# Patient Record
Sex: MALE | Race: WHITE | NOT HISPANIC OR LATINO | Employment: OTHER | URBAN - NONMETROPOLITAN AREA
[De-identification: names, ages, dates, MRNs, and addresses within clinical notes are randomized per-mention and may not be internally consistent; named-entity substitution may affect disease eponyms.]

---

## 2018-10-25 ENCOUNTER — OFFICE VISIT (OUTPATIENT)
Dept: INTERNAL MEDICINE | Facility: CLINIC | Age: 53
End: 2018-10-25

## 2018-10-25 VITALS
WEIGHT: 239 LBS | OXYGEN SATURATION: 98 % | SYSTOLIC BLOOD PRESSURE: 140 MMHG | HEART RATE: 74 BPM | HEIGHT: 72 IN | BODY MASS INDEX: 32.37 KG/M2 | RESPIRATION RATE: 16 BRPM | TEMPERATURE: 97.4 F | DIASTOLIC BLOOD PRESSURE: 98 MMHG

## 2018-10-25 DIAGNOSIS — E66.9 CLASS 1 OBESITY WITH BODY MASS INDEX (BMI) OF 32.0 TO 32.9 IN ADULT, UNSPECIFIED OBESITY TYPE, UNSPECIFIED WHETHER SERIOUS COMORBIDITY PRESENT: ICD-10-CM

## 2018-10-25 DIAGNOSIS — Z12.5 ENCOUNTER FOR SCREENING FOR MALIGNANT NEOPLASM OF PROSTATE: ICD-10-CM

## 2018-10-25 DIAGNOSIS — Z00.00 PHYSICAL EXAM, ANNUAL: ICD-10-CM

## 2018-10-25 DIAGNOSIS — K21.9 GASTROESOPHAGEAL REFLUX DISEASE, ESOPHAGITIS PRESENCE NOT SPECIFIED: ICD-10-CM

## 2018-10-25 DIAGNOSIS — F41.9 ANXIETY: ICD-10-CM

## 2018-10-25 DIAGNOSIS — M25.50 MULTIPLE JOINT PAIN: ICD-10-CM

## 2018-10-25 DIAGNOSIS — R73.9 HYPERGLYCEMIA: ICD-10-CM

## 2018-10-25 DIAGNOSIS — M79.10 MUSCLE TENDERNESS: ICD-10-CM

## 2018-10-25 DIAGNOSIS — E55.9 VITAMIN D INSUFFICIENCY: ICD-10-CM

## 2018-10-25 DIAGNOSIS — R53.83 OTHER FATIGUE: Primary | ICD-10-CM

## 2018-10-25 LAB
EXPIRATION DATE: NORMAL
HBA1C MFR BLD: 5.4 %
Lab: NORMAL

## 2018-10-25 PROCEDURE — 99386 PREV VISIT NEW AGE 40-64: CPT | Performed by: NURSE PRACTITIONER

## 2018-10-25 PROCEDURE — 83036 HEMOGLOBIN GLYCOSYLATED A1C: CPT | Performed by: NURSE PRACTITIONER

## 2018-10-25 RX ORDER — SERTRALINE HYDROCHLORIDE 25 MG/1
25 TABLET, FILM COATED ORAL DAILY
Qty: 30 TABLET | Refills: 1 | Status: SHIPPED | OUTPATIENT
Start: 2018-10-25 | End: 2018-12-14 | Stop reason: SDUPTHER

## 2018-10-25 RX ORDER — OMEPRAZOLE 40 MG/1
CAPSULE, DELAYED RELEASE ORAL
Refills: 2 | COMMUNITY
Start: 2018-10-16 | End: 2019-07-17 | Stop reason: SDUPTHER

## 2018-10-25 RX ORDER — NAPROXEN 500 MG/1
500 TABLET ORAL 2 TIMES DAILY
Refills: 0 | COMMUNITY
Start: 2018-08-01 | End: 2020-11-23

## 2018-10-25 RX ORDER — CYCLOBENZAPRINE HCL 5 MG
5 TABLET ORAL NIGHTLY PRN
Qty: 15 TABLET | Refills: 0 | Status: SHIPPED | OUTPATIENT
Start: 2018-10-25 | End: 2020-11-23

## 2018-10-25 RX ORDER — FENOFIBRATE 48 MG/1
TABLET ORAL
Refills: 2 | COMMUNITY
Start: 2018-10-16 | End: 2018-11-26 | Stop reason: SDUPTHER

## 2018-10-30 ENCOUNTER — APPOINTMENT (OUTPATIENT)
Dept: LAB | Facility: HOSPITAL | Age: 53
End: 2018-10-30

## 2018-10-30 LAB
25(OH)D3 SERPL-MCNC: 59 NG/ML
ALBUMIN SERPL-MCNC: 5 G/DL (ref 3.5–5)
ALBUMIN/GLOB SERPL: 1.4 G/DL (ref 1–2)
ALP SERPL-CCNC: 47 U/L (ref 38–126)
ALT SERPL W P-5'-P-CCNC: 102 U/L (ref 13–69)
ANION GAP SERPL CALCULATED.3IONS-SCNC: 11.9 MMOL/L (ref 10–20)
AST SERPL-CCNC: 60 U/L (ref 15–46)
BASOPHILS # BLD AUTO: 0.05 10*3/MM3 (ref 0–0.2)
BASOPHILS NFR BLD AUTO: 1 % (ref 0–2.5)
BILIRUB SERPL-MCNC: 0.8 MG/DL (ref 0.2–1.3)
BUN BLD-MCNC: 19 MG/DL (ref 7–20)
BUN/CREAT SERPL: 15.8 (ref 6.3–21.9)
CALCIUM SPEC-SCNC: 9.8 MG/DL (ref 8.4–10.2)
CHLORIDE SERPL-SCNC: 102 MMOL/L (ref 98–107)
CHOLEST SERPL-MCNC: 214 MG/DL (ref 0–199)
CO2 SERPL-SCNC: 28 MMOL/L (ref 26–30)
CREAT BLD-MCNC: 1.2 MG/DL (ref 0.6–1.3)
DEPRECATED RDW RBC AUTO: 41.4 FL (ref 37–54)
EOSINOPHIL # BLD AUTO: 0.25 10*3/MM3 (ref 0–0.7)
EOSINOPHIL NFR BLD AUTO: 4.9 % (ref 0–7)
ERYTHROCYTE [DISTWIDTH] IN BLOOD BY AUTOMATED COUNT: 13.1 % (ref 11.5–14.5)
GFR SERPL CREATININE-BSD FRML MDRD: 63 ML/MIN/1.73
GLOBULIN UR ELPH-MCNC: 3.6 GM/DL
GLUCOSE BLD-MCNC: 113 MG/DL (ref 74–98)
HCT VFR BLD AUTO: 47.7 % (ref 42–52)
HDLC SERPL-MCNC: 56 MG/DL (ref 40–60)
HGB BLD-MCNC: 15.8 G/DL (ref 14–18)
IMM GRANULOCYTES # BLD: 0.03 10*3/MM3 (ref 0–0.06)
IMM GRANULOCYTES NFR BLD: 0.6 % (ref 0–0.6)
LDLC SERPL CALC-MCNC: 122 MG/DL (ref 0–99)
LDLC/HDLC SERPL: 2.17 {RATIO}
LYMPHOCYTES # BLD AUTO: 1.14 10*3/MM3 (ref 0.6–3.4)
LYMPHOCYTES NFR BLD AUTO: 22.2 % (ref 10–50)
MCH RBC QN AUTO: 29 PG (ref 27–31)
MCHC RBC AUTO-ENTMCNC: 33.1 G/DL (ref 30–37)
MCV RBC AUTO: 87.5 FL (ref 80–94)
MONOCYTES # BLD AUTO: 0.6 10*3/MM3 (ref 0–0.9)
MONOCYTES NFR BLD AUTO: 11.7 % (ref 0–12)
NEUTROPHILS # BLD AUTO: 3.07 10*3/MM3 (ref 2–6.9)
NEUTROPHILS NFR BLD AUTO: 59.6 % (ref 37–80)
NRBC BLD MANUAL-RTO: 0 /100 WBC (ref 0–0)
PLATELET # BLD AUTO: 252 10*3/MM3 (ref 130–400)
PMV BLD AUTO: 10.7 FL (ref 6–12)
POTASSIUM BLD-SCNC: 3.9 MMOL/L (ref 3.5–5.1)
PROT SERPL-MCNC: 8.6 G/DL (ref 6.3–8.2)
PSA SERPL-MCNC: 0.69 NG/ML (ref 0.06–4)
RBC # BLD AUTO: 5.45 10*6/MM3 (ref 4.7–6.1)
SODIUM BLD-SCNC: 138 MMOL/L (ref 137–145)
T4 FREE SERPL-MCNC: 1.1 NG/DL (ref 0.78–2.19)
TRIGL SERPL-MCNC: 182 MG/DL
TSH SERPL DL<=0.05 MIU/L-ACNC: 3.13 MIU/ML (ref 0.47–4.68)
VLDLC SERPL-MCNC: 36.4 MG/DL
WBC NRBC COR # BLD: 5.14 10*3/MM3 (ref 4.8–10.8)

## 2018-10-30 PROCEDURE — 84403 ASSAY OF TOTAL TESTOSTERONE: CPT | Performed by: NURSE PRACTITIONER

## 2018-10-30 PROCEDURE — 80053 COMPREHEN METABOLIC PANEL: CPT | Performed by: NURSE PRACTITIONER

## 2018-10-30 PROCEDURE — G0103 PSA SCREENING: HCPCS | Performed by: NURSE PRACTITIONER

## 2018-10-30 PROCEDURE — 80061 LIPID PANEL: CPT | Performed by: NURSE PRACTITIONER

## 2018-10-30 PROCEDURE — 36415 COLL VENOUS BLD VENIPUNCTURE: CPT | Performed by: NURSE PRACTITIONER

## 2018-10-30 PROCEDURE — 84402 ASSAY OF FREE TESTOSTERONE: CPT | Performed by: NURSE PRACTITIONER

## 2018-10-30 PROCEDURE — 82306 VITAMIN D 25 HYDROXY: CPT | Performed by: NURSE PRACTITIONER

## 2018-10-30 PROCEDURE — 84439 ASSAY OF FREE THYROXINE: CPT | Performed by: NURSE PRACTITIONER

## 2018-10-30 PROCEDURE — 84443 ASSAY THYROID STIM HORMONE: CPT | Performed by: NURSE PRACTITIONER

## 2018-10-30 PROCEDURE — 85025 COMPLETE CBC W/AUTO DIFF WBC: CPT | Performed by: NURSE PRACTITIONER

## 2018-10-31 ENCOUNTER — TELEPHONE (OUTPATIENT)
Dept: INTERNAL MEDICINE | Facility: CLINIC | Age: 53
End: 2018-10-31

## 2018-10-31 NOTE — TELEPHONE ENCOUNTER
Wife called stating that patient is dealing with a lot of anxiety waiting for the blood test results and would like a call back to discuss the results. Please advise.

## 2018-10-31 NOTE — TELEPHONE ENCOUNTER
Left vm for patient that all labs not back and as soon as they was we would call. Not to stress over these

## 2018-11-02 LAB
TESTOST FREE SERPL-MCNC: 10.4 PG/ML (ref 7.2–24)
TESTOST SERPL-MCNC: 505.4 NG/DL (ref 264–916)

## 2018-11-26 ENCOUNTER — OFFICE VISIT (OUTPATIENT)
Dept: INTERNAL MEDICINE | Facility: CLINIC | Age: 53
End: 2018-11-26

## 2018-11-26 VITALS
WEIGHT: 239 LBS | DIASTOLIC BLOOD PRESSURE: 99 MMHG | HEIGHT: 72 IN | TEMPERATURE: 97.9 F | OXYGEN SATURATION: 100 % | RESPIRATION RATE: 16 BRPM | BODY MASS INDEX: 32.37 KG/M2 | HEART RATE: 80 BPM | SYSTOLIC BLOOD PRESSURE: 143 MMHG

## 2018-11-26 DIAGNOSIS — Z23 NEED FOR HEPATITIS A VACCINATION: ICD-10-CM

## 2018-11-26 DIAGNOSIS — N52.9 ERECTILE DYSFUNCTION, UNSPECIFIED ERECTILE DYSFUNCTION TYPE: ICD-10-CM

## 2018-11-26 DIAGNOSIS — E78.2 MIXED HYPERLIPIDEMIA: ICD-10-CM

## 2018-11-26 DIAGNOSIS — E66.9 CLASS 1 OBESITY WITH BODY MASS INDEX (BMI) OF 32.0 TO 32.9 IN ADULT, UNSPECIFIED OBESITY TYPE, UNSPECIFIED WHETHER SERIOUS COMORBIDITY PRESENT: ICD-10-CM

## 2018-11-26 DIAGNOSIS — J01.40 ACUTE PANSINUSITIS, RECURRENCE NOT SPECIFIED: ICD-10-CM

## 2018-11-26 DIAGNOSIS — M25.50 MULTIPLE JOINT PAIN: ICD-10-CM

## 2018-11-26 DIAGNOSIS — F41.9 ANXIETY: Primary | ICD-10-CM

## 2018-11-26 DIAGNOSIS — R53.83 OTHER FATIGUE: ICD-10-CM

## 2018-11-26 DIAGNOSIS — R74.8 ELEVATED LIVER ENZYMES: ICD-10-CM

## 2018-11-26 PROCEDURE — 90632 HEPA VACCINE ADULT IM: CPT | Performed by: NURSE PRACTITIONER

## 2018-11-26 PROCEDURE — 99214 OFFICE O/P EST MOD 30 MIN: CPT | Performed by: NURSE PRACTITIONER

## 2018-11-26 PROCEDURE — 90471 IMMUNIZATION ADMIN: CPT | Performed by: NURSE PRACTITIONER

## 2018-11-26 RX ORDER — FENOFIBRATE 48 MG/1
48 TABLET, COATED ORAL DAILY
Qty: 90 TABLET | Refills: 0 | Status: SHIPPED | OUTPATIENT
Start: 2018-11-26 | End: 2019-04-29 | Stop reason: SDUPTHER

## 2018-11-26 RX ORDER — AMOXICILLIN AND CLAVULANATE POTASSIUM 875; 125 MG/1; MG/1
1 TABLET, FILM COATED ORAL 2 TIMES DAILY
Qty: 20 TABLET | Refills: 0 | Status: SHIPPED | OUTPATIENT
Start: 2018-11-26 | End: 2018-12-06

## 2018-11-26 NOTE — PROGRESS NOTES
Chief Complaint / Reason:      Chief Complaint   Patient presents with   • Fatigue     patient states he is feeling better, not as tired   • medication refills     patient needs refills on Fenofibrate 48 mg sent to [facility]     HPI  Patient presents today for follow-up regarding fatigue and medication refills.  He states that he is feeling a lot better and not as tired.  He has cut down drinking whiskey and does continue to drink beer.  He states that he feels like the medication is working as he is not as anxious but it has affected his ability to maintain an erection.  He also states that his wife is going through menopause so that is a lot of the issues also.  He denies SI or HI.  He states he is taking the medication as prescribed in regards to the neck pain he has not tried the muscle relaxer but states that he will.  Vital signs are stable with exception of elevated blood pressure but they have been checking at home and it has been in normal range.  Patient also complains about sinus pressure headache and congestion he states it's been going on for a few weeks and as pressure in his ears.  Denies fever or chills.  History taken from: patient    PMH/FH/Social History were reviewed and updated appropriately in the electronic medical record.     Review of Systems:   Review of Systems   Constitutional: Negative.    HENT: Positive for congestion, sinus pressure, sinus pain and sore throat.    Respiratory: Negative.    Cardiovascular: Negative.    Gastrointestinal: Negative.    Skin: Negative.    Neurological: Positive for headaches.   Hematological: Negative for adenopathy.   Psychiatric/Behavioral: The patient is nervous/anxious.      All other systems were reviewed and are negative.  Exceptions are noted in the subjective or above.      Objective     Vital Signs  Vitals:    11/26/18 1120   BP: 143/99   Pulse: 80   Resp: 16   Temp: 97.9 °F (36.6 °C)   SpO2: 100%       Body mass index is  32.41 kg/m².    Physical Exam   Constitutional: He is oriented to person, place, and time. He appears well-developed and well-nourished.   HENT:   Head: Normocephalic.   Right Ear: External ear normal. Tympanic membrane is erythematous and bulging.   Left Ear: External ear normal. Tympanic membrane is erythematous and bulging.   Nose: Right sinus exhibits maxillary sinus tenderness and frontal sinus tenderness. Left sinus exhibits maxillary sinus tenderness and frontal sinus tenderness.   Mouth/Throat: Mucous membranes are dry. Posterior oropharyngeal erythema present.   Cardiovascular: Normal rate, regular rhythm, normal heart sounds and intact distal pulses.   Pulmonary/Chest: Effort normal and breath sounds normal. He exhibits no tenderness.   Abdominal: Soft. Bowel sounds are normal.   Lymphadenopathy:     He has no cervical adenopathy.   Neurological: He is alert and oriented to person, place, and time.   Skin: Skin is warm and dry. Capillary refill takes less than 2 seconds.   Psychiatric: His behavior is normal. Judgment and thought content normal. His mood appears anxious.   Nursing note and vitals reviewed.       Results Review:    I reviewed the patient's previous clinical results.       Medication Review:     Current Outpatient Medications:   •  cyclobenzaprine (FLEXERIL) 5 MG tablet, Take 1 tablet by mouth At Night As Needed for Muscle Spasms., Disp: 15 tablet, Rfl: 0  •  fenofibrate (TRICOR) 48 MG tablet, Take 1 tablet by mouth Daily for 90 days., Disp: 90 tablet, Rfl: 0  •  naproxen (NAPROSYN) 500 MG tablet, Take 500 mg by mouth 2 (Two) Times a Day., Disp: , Rfl: 0  •  omeprazole (priLOSEC) 40 MG capsule, TAKE ONE CAPSULE BY MOUTH EVERY DAY BEFORE A MEAL, Disp: , Rfl: 2  •  sertraline (ZOLOFT) 25 MG tablet, Take 1 tablet by mouth Daily., Disp: 30 tablet, Rfl: 1  •  amoxicillin-clavulanate (AUGMENTIN) 875-125 MG per tablet, Take 1 tablet by mouth 2 (Two) Times a Day for 10 days., Disp: 20 tablet, Rfl:  0    Assessment/Plan   Kulwant was seen today for fatigue and medication refills.    Diagnoses and all orders for this visit:    Anxiety  Stable continue Zoloft as prescribed  Multiple joint pain  -     SHAYLEE  -     Sedimentation rate, automated    Erectile dysfunction, unspecified erectile dysfunction type  Discussed medication options and nonpharmacological behaviors.  Elevated liver enzymes  Discussed medications that may increase liver enzymes and recommend patient decrease alcohol usage  Class 1 obesity with body mass index (BMI) of 32.0 to 32.9 in adult, unspecified obesity type, unspecified whether serious comorbidity present  Patient's Body mass index is 32.41 kg/m². BMI is above normal parameters. Recommendations include: exercise counseling and nutrition counseling.    Other fatigue  improving  Acute pansinusitis, recurrence not specified  -     amoxicillin-clavulanate (AUGMENTIN) 875-125 MG per tablet; Take 1 tablet by mouth 2 (Two) Times a Day for 10 days.    Need for hepatitis A vaccination  -     Hepatitis A Vaccine Adult (HAVRIX) - Today  -     Hepatitis A Vaccine Adult  (HAVRIX) - Dose 2; Future    Mixed hyperlipidemia  -     fenofibrate (TRICOR) 48 MG tablet; Take 1 tablet by mouth Daily for 90 days.  Discussed cardiac diet with patient and recommended exercise.    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  Return in about 4 weeks (around 12/24/2018), or if symptoms worsen or fail to improve.    Jaja Denson, APRN  11/26/2018

## 2018-12-14 ENCOUNTER — TELEPHONE (OUTPATIENT)
Dept: INTERNAL MEDICINE | Facility: CLINIC | Age: 53
End: 2018-12-14

## 2018-12-14 DIAGNOSIS — F41.9 ANXIETY: ICD-10-CM

## 2018-12-14 NOTE — TELEPHONE ENCOUNTER
PATIENT'S WIFE CAM INTO THE OFFICE TO REQUEST HER 'S ZOLOFT INCREASED PER HIS REQUEST. SHE SAYS MISS MIRANDA SAID THAT WOULD BE OKAY IF HE NEEDED IT TO BE. PLEASE SEND NEW PRESCRIPTION TO PHARM. THANK YOU.   HUNTER MCCALL IN [place]

## 2019-01-04 ENCOUNTER — APPOINTMENT (OUTPATIENT)
Dept: LAB | Facility: HOSPITAL | Age: 54
End: 2019-01-04

## 2019-01-04 LAB — ERYTHROCYTE [SEDIMENTATION RATE] IN BLOOD: 1 MM/HR (ref 0–15)

## 2019-01-04 PROCEDURE — 86038 ANTINUCLEAR ANTIBODIES: CPT | Performed by: NURSE PRACTITIONER

## 2019-01-04 PROCEDURE — 85651 RBC SED RATE NONAUTOMATED: CPT | Performed by: NURSE PRACTITIONER

## 2019-01-04 PROCEDURE — 36415 COLL VENOUS BLD VENIPUNCTURE: CPT | Performed by: NURSE PRACTITIONER

## 2019-01-07 LAB — ANA SER QL: NEGATIVE

## 2019-04-29 DIAGNOSIS — E78.2 MIXED HYPERLIPIDEMIA: ICD-10-CM

## 2019-04-29 RX ORDER — FENOFIBRATE 48 MG/1
TABLET, COATED ORAL
Qty: 90 TABLET | Refills: 0 | Status: SHIPPED | OUTPATIENT
Start: 2019-04-29 | End: 2019-08-31 | Stop reason: SDUPTHER

## 2019-07-17 DIAGNOSIS — K21.9 GASTROESOPHAGEAL REFLUX DISEASE, ESOPHAGITIS PRESENCE NOT SPECIFIED: ICD-10-CM

## 2019-07-17 RX ORDER — OMEPRAZOLE 40 MG/1
40 CAPSULE, DELAYED RELEASE ORAL DAILY
Qty: 90 CAPSULE | Refills: 1 | Status: SHIPPED | OUTPATIENT
Start: 2019-07-17 | End: 2020-11-23

## 2019-07-18 ENCOUNTER — TELEPHONE (OUTPATIENT)
Dept: INTERNAL MEDICINE | Facility: CLINIC | Age: 54
End: 2019-07-18

## 2019-07-18 RX ORDER — OMEPRAZOLE 20 MG/1
20 CAPSULE, DELAYED RELEASE ORAL DAILY
Qty: 90 CAPSULE | Refills: 1 | Status: SHIPPED | OUTPATIENT
Start: 2019-07-18 | End: 2020-09-30

## 2019-07-18 NOTE — TELEPHONE ENCOUNTER
Patient's wife Diane called stating that refill of omeprazole that was sent in, was the wrong dosage. States that patient has always taken 20mg. Can you look into this? Note from 10/2018  states 40mg.

## 2019-08-31 DIAGNOSIS — E78.2 MIXED HYPERLIPIDEMIA: ICD-10-CM

## 2019-09-03 RX ORDER — FENOFIBRATE 48 MG/1
TABLET, COATED ORAL
Qty: 90 TABLET | Refills: 0 | Status: SHIPPED | OUTPATIENT
Start: 2019-09-03 | End: 2020-02-15

## 2019-10-04 DIAGNOSIS — F41.9 ANXIETY: ICD-10-CM

## 2020-01-30 ENCOUNTER — TELEPHONE (OUTPATIENT)
Dept: INTERNAL MEDICINE | Facility: CLINIC | Age: 55
End: 2020-01-30

## 2020-01-31 DIAGNOSIS — Z20.828 EXPOSURE TO THE FLU: Primary | ICD-10-CM

## 2020-01-31 RX ORDER — OSELTAMIVIR PHOSPHATE 75 MG/1
75 CAPSULE ORAL DAILY
Qty: 10 CAPSULE | Refills: 0 | Status: SHIPPED | OUTPATIENT
Start: 2020-01-31 | End: 2020-02-10

## 2020-01-31 NOTE — TELEPHONE ENCOUNTER
I sent in Rx.  Please tell patient that it needs to be started within 48 hours otherwise it is not effective.

## 2020-02-15 DIAGNOSIS — E78.2 MIXED HYPERLIPIDEMIA: ICD-10-CM

## 2020-02-15 RX ORDER — FENOFIBRATE 48 MG/1
TABLET, COATED ORAL
Qty: 90 TABLET | Refills: 0 | Status: SHIPPED | OUTPATIENT
Start: 2020-02-15 | End: 2020-05-28

## 2020-02-24 DIAGNOSIS — F41.9 ANXIETY: ICD-10-CM

## 2020-03-31 DIAGNOSIS — F41.9 ANXIETY: ICD-10-CM

## 2020-05-27 DIAGNOSIS — E78.2 MIXED HYPERLIPIDEMIA: ICD-10-CM

## 2020-05-28 RX ORDER — FENOFIBRATE 48 MG/1
TABLET, COATED ORAL
Qty: 90 TABLET | Refills: 0 | Status: SHIPPED | OUTPATIENT
Start: 2020-05-28 | End: 2020-09-09

## 2020-09-08 DIAGNOSIS — E78.2 MIXED HYPERLIPIDEMIA: ICD-10-CM

## 2020-09-09 DIAGNOSIS — E78.2 MIXED HYPERLIPIDEMIA: ICD-10-CM

## 2020-09-09 RX ORDER — FENOFIBRATE 48 MG/1
48 TABLET, COATED ORAL DAILY
Qty: 30 TABLET | Refills: 0 | Status: SHIPPED | OUTPATIENT
Start: 2020-09-09 | End: 2020-09-09

## 2020-09-09 RX ORDER — FENOFIBRATE 48 MG/1
TABLET, COATED ORAL
Qty: 30 TABLET | Refills: 0 | Status: SHIPPED | OUTPATIENT
Start: 2020-09-09 | End: 2020-11-23 | Stop reason: SDUPTHER

## 2020-09-09 RX ORDER — FENOFIBRATE 48 MG/1
TABLET, COATED ORAL
Qty: 90 TABLET | OUTPATIENT
Start: 2020-09-09

## 2020-09-30 RX ORDER — OMEPRAZOLE 20 MG/1
CAPSULE, DELAYED RELEASE ORAL
Qty: 90 CAPSULE | OUTPATIENT
Start: 2020-09-30

## 2020-09-30 RX ORDER — OMEPRAZOLE 20 MG/1
20 CAPSULE, DELAYED RELEASE ORAL DAILY
Qty: 30 CAPSULE | Refills: 0 | Status: SHIPPED | OUTPATIENT
Start: 2020-09-30 | End: 2020-11-23 | Stop reason: SDUPTHER

## 2020-10-12 DIAGNOSIS — F41.9 ANXIETY: ICD-10-CM

## 2020-11-23 ENCOUNTER — TELEMEDICINE (OUTPATIENT)
Dept: INTERNAL MEDICINE | Facility: CLINIC | Age: 55
End: 2020-11-23

## 2020-11-23 VITALS — HEART RATE: 82 BPM | DIASTOLIC BLOOD PRESSURE: 83 MMHG | SYSTOLIC BLOOD PRESSURE: 122 MMHG

## 2020-11-23 DIAGNOSIS — F41.9 ANXIETY: ICD-10-CM

## 2020-11-23 DIAGNOSIS — K21.9 GASTROESOPHAGEAL REFLUX DISEASE, UNSPECIFIED WHETHER ESOPHAGITIS PRESENT: ICD-10-CM

## 2020-11-23 DIAGNOSIS — E78.2 MIXED HYPERLIPIDEMIA: ICD-10-CM

## 2020-11-23 DIAGNOSIS — E78.2 MIXED HYPERLIPIDEMIA: Primary | ICD-10-CM

## 2020-11-23 PROCEDURE — 99213 OFFICE O/P EST LOW 20 MIN: CPT | Performed by: NURSE PRACTITIONER

## 2020-11-23 RX ORDER — FENOFIBRATE 48 MG/1
48 TABLET, COATED ORAL DAILY
Qty: 30 TABLET | Refills: 5 | Status: CANCELLED | OUTPATIENT
Start: 2020-11-23

## 2020-11-23 RX ORDER — FENOFIBRATE 48 MG/1
48 TABLET, COATED ORAL DAILY
Qty: 90 TABLET | Refills: 1 | Status: SHIPPED | OUTPATIENT
Start: 2020-11-23 | End: 2022-01-05 | Stop reason: SDUPTHER

## 2020-11-23 RX ORDER — OMEPRAZOLE 20 MG/1
20 CAPSULE, DELAYED RELEASE ORAL DAILY
Qty: 90 CAPSULE | Refills: 1 | Status: SHIPPED | OUTPATIENT
Start: 2020-11-23 | End: 2021-08-09

## 2020-11-23 NOTE — PROGRESS NOTES
You have chosen to receive care through a telehealth visit.  Do you consent to use a video/audio connection for your medical care today? Yes  Involved parties: Em Grant CMA, TRINA Posada and patient.  Patient presents for a virtual visit. This visit was scheduled as a video visit to comply with patient safety concerns in accordance with CDC recommendations.    Chief Complaint / Reason:      Chief Complaint   Patient presents with   • Anxiety   • Hyperlipidemia       Subjective     HPI  Patient presents today for follow-up regarding anxiety and hyperlipidemia.  He states he is stable on the medication he states that he does not take his cholesterol medicine that often as he does take fish oil.  He does chew tobacco that he grows and it does not have the additives in it and states that he has been doing dietary modifications along with exercise and has lost some weight.  He states he was to get down to 200 pounds.  He states his blood pressure is doing better.  Denies chest pain, shortness of breath or heart palpitations denies any lower extremity edema or any acute issues at this time.  History taken from: patient    PMH/FH/Social History were reviewed and updated appropriately in the electronic medical record.   Past Medical History:   Diagnosis Date   • Hyperlipidemia      Past Surgical History:   Procedure Laterality Date   • LEG SURGERY       Social History     Socioeconomic History   • Marital status:      Spouse name: Not on file   • Number of children: Not on file   • Years of education: Not on file   • Highest education level: Not on file   Tobacco Use   • Smoking status: Former Smoker   • Smokeless tobacco: Current User   Substance and Sexual Activity   • Alcohol use: Yes     Alcohol/week: 6.0 standard drinks     Types: 5 Cans of beer, 1 Shots of liquor per week   • Drug use: No     Family History   Problem Relation Age of Onset   • Hypertension Mother    • Arthritis Mother    • Cancer  Father    • Hyperlipidemia Father    • Diabetes Sister    • Anxiety disorder Brother    • Cancer Maternal Grandfather    • Diabetes Paternal Grandmother        Review of Systems:   Review of Systems   Constitutional: Negative.    Respiratory: Negative.    Cardiovascular: Negative.    Neurological: Negative.    Psychiatric/Behavioral: Negative.          All other systems were reviewed and are negative.  Exceptions are noted in the subjective or above.      Objective     Vital Signs  Vitals:    11/23/20 1004   BP: 122/83   Pulse: 82       There is no height or weight on file to calculate BMI.    Physical Exam  Nursing note reviewed.   Constitutional:       General: He is not in acute distress.     Appearance: He is well-developed. He is not diaphoretic.   HENT:      Head: Normocephalic.   Pulmonary:      Effort: Pulmonary effort is normal. No respiratory distress.   Chest:      Chest wall: No tenderness.   Skin:     General: Skin is warm and dry.      Capillary Refill: Capillary refill takes less than 2 seconds.   Neurological:      Mental Status: He is alert and oriented to person, place, and time.   Psychiatric:         Behavior: Behavior normal.         Thought Content: Thought content normal.         Judgment: Judgment normal.              Results Review:    I reviewed the patient's previous clinical results.       Medication Review:     Current Outpatient Medications:   •  fenofibrate (TRICOR) 48 MG tablet, Take 1 tablet by mouth Daily., Disp: 90 tablet, Rfl: 1  •  omeprazole (priLOSEC) 20 MG capsule, Take 1 capsule by mouth Daily., Disp: 90 capsule, Rfl: 1  •  sertraline (ZOLOFT) 50 MG tablet, Take 1 tablet by mouth Daily., Disp: 90 tablet, Rfl: 1    Diagnoses and all orders for this visit:    Mixed hyperlipidemia  -     fenofibrate (TRICOR) 48 MG tablet; Take 1 tablet by mouth Daily.  Stable without worsening signs and symptoms  Anxiety  -     sertraline (ZOLOFT) 50 MG tablet; Take 1 tablet by mouth  Daily.  Stable he states he only takes half a tablet and he would like to continue current medication regimen  Gastroesophageal reflux disease, unspecified whether esophagitis present  -     omeprazole (priLOSEC) 20 MG capsule; Take 1 capsule by mouth Daily.    Stable without worsening signs and symptoms    Advised patient to follow-up for annual physical/labs and he would like to wait until Covid decreases as he and his wife are both nervous.  tReturn if symptoms worsen or fail to improve, for Annual.    Jaja Denson, APRN  11/23/2020

## 2021-03-11 ENCOUNTER — TELEMEDICINE (OUTPATIENT)
Dept: INTERNAL MEDICINE | Facility: CLINIC | Age: 56
End: 2021-03-11

## 2021-03-11 DIAGNOSIS — G47.30 SLEEP APNEA WITH USE OF CONTINUOUS POSITIVE AIRWAY PRESSURE (CPAP): ICD-10-CM

## 2021-03-11 DIAGNOSIS — I78.1 TELANGIECTASIA OF FACE: ICD-10-CM

## 2021-03-11 DIAGNOSIS — L71.9 ROSACEA: Primary | ICD-10-CM

## 2021-03-11 PROCEDURE — 99214 OFFICE O/P EST MOD 30 MIN: CPT | Performed by: NURSE PRACTITIONER

## 2021-03-11 RX ORDER — METRONIDAZOLE 10 MG/G
GEL TOPICAL DAILY
Qty: 55 G | Refills: 1 | Status: SHIPPED | OUTPATIENT
Start: 2021-03-11 | End: 2022-03-24

## 2021-03-11 NOTE — PROGRESS NOTES
Chief Complaint  Sleep Apnea        Kulwant Arango presents to [facility]  You have chosen to receive care through a telehealth visit.  Do you consent to use a video/audio connection for your medical care today? Yes  Active parties with video visit  Jaja Morales [facility]  Kulwant Arango Patient      Sleep Apnea  Associated symptoms include fatigue.         Chief Complaint / Reason:      Chief Complaint   Patient presents with   • Sleep Apnea       Subjective     HPI  Patient presents today with complaints of sleep apnea and states that he has been wearing the same mask for years he states that he was diagnosed probably 15 years ago.  He denies chest pain, shortness of breath or heart palpitations.  He is requesting a new mask but has not had any testing for quite some time.  He did check his vitals and blood pressure was 119/80 heart rate 77.  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  He does have redness on both of his cheeks and sometimes wears a hat when forming and sometimes he does not.  Patient does drink alcohol and chews tobacco.    History taken from: patient/wife    PMH/FH/Social History were reviewed and updated appropriately in the electronic medical record.   Past Medical History:   Diagnosis Date   • Hyperlipidemia      Past Surgical History:   Procedure Laterality Date   • LEG SURGERY       Social History     Socioeconomic History   • Marital status:      Spouse name: Not on file   • Number of children: Not on file   • Years of education: Not on file   • Highest education level: Not on file   Tobacco Use   • Smoking status: Former Smoker   • Smokeless tobacco: Current User   Substance and Sexual Activity   • Alcohol use: Yes     Alcohol/week: 6.0 standard drinks     Types: 5 Cans of beer, 1 Shots of liquor per week   • Drug use:  No     Family History   Problem Relation Age of Onset   • Hypertension Mother    • Arthritis Mother    • Cancer Father    • Hyperlipidemia Father    • Diabetes Sister    • Anxiety disorder Brother    • Cancer Maternal Grandfather    • Diabetes Paternal Grandmother        Review of Systems:   Review of Systems   Constitutional: Positive for fatigue.   Respiratory: Negative.    Cardiovascular: Negative.    Skin: Positive for color change and dry skin.   Allergic/Immunologic: Positive for environmental allergies.   Neurological: Negative.    Psychiatric/Behavioral: Negative.  Positive for stress.         All other systems were reviewed and are negative.  Exceptions are noted in the subjective or above.      Objective     Vital Signs  There were no vitals filed for this visit.    There is no height or weight on file to calculate BMI.    Physical Exam  Nursing note reviewed.   Constitutional:       General: He is not in acute distress.     Appearance: He is well-developed. He is not diaphoretic.   HENT:      Head: Normocephalic.   Pulmonary:      Effort: Pulmonary effort is normal. No respiratory distress.   Chest:      Chest wall: No tenderness.   Skin:     General: Skin is warm and dry.      Capillary Refill: Capillary refill takes less than 2 seconds.      Findings: Erythema and rash present.             Comments: Physical examination directed per patient redness noted on bilateral cheeks.   Neurological:      Mental Status: He is alert and oriented to person, place, and time.   Psychiatric:         Behavior: Behavior normal.         Thought Content: Thought content normal.         Judgment: Judgment normal.              Results Review:    I reviewed the patient's previous clinical results.       Medication Review:     Current Outpatient Medications:   •  fenofibrate (TRICOR) 48 MG tablet, Take 1 tablet by mouth Daily., Disp: 90 tablet, Rfl: 1  •  omeprazole (priLOSEC) 20 MG capsule, Take 1 capsule by mouth Daily.,  Disp: 90 capsule, Rfl: 1  •  sertraline (ZOLOFT) 50 MG tablet, Take 1 tablet by mouth Daily., Disp: 90 tablet, Rfl: 1  •  metroNIDAZOLE (Metrogel) 1 % gel, Apply  topically to the appropriate area as directed Daily., Disp: 55 g, Rfl: 1    Diagnoses and all orders for this visit:    Rosacea  -     metroNIDAZOLE (Metrogel) 1 % gel; Apply  topically to the appropriate area as directed Daily.  -     Ambulatory Referral to Dermatology  Discussed nonpharmacological interventions with patient and recommend wearing hat when outside.  Telangiectasia of face  -     Ambulatory Referral to Dermatology  Recommend limiting alcohol intake and wearing hats when outside to prevent sun damage    sleep apnea with use of continuous positive airway pressure (CPAP)  -     Ambulatory Referral to Sleep Medicine          Return if symptoms worsen or fail to improve.    Jaja Denson, APRN  03/11/2021

## 2021-03-23 ENCOUNTER — OFFICE VISIT (OUTPATIENT)
Dept: NEUROLOGY | Facility: CLINIC | Age: 56
End: 2021-03-23

## 2021-03-23 VITALS
OXYGEN SATURATION: 96 % | DIASTOLIC BLOOD PRESSURE: 80 MMHG | WEIGHT: 242.4 LBS | HEIGHT: 72 IN | BODY MASS INDEX: 32.83 KG/M2 | SYSTOLIC BLOOD PRESSURE: 130 MMHG | HEART RATE: 75 BPM | TEMPERATURE: 97.3 F

## 2021-03-23 DIAGNOSIS — G47.33 OSA (OBSTRUCTIVE SLEEP APNEA): Primary | ICD-10-CM

## 2021-03-23 DIAGNOSIS — E78.5 DYSLIPIDEMIA: ICD-10-CM

## 2021-03-23 PROCEDURE — 99214 OFFICE O/P EST MOD 30 MIN: CPT | Performed by: NURSE PRACTITIONER

## 2021-03-23 NOTE — PATIENT INSTRUCTIONS
Sleep Apnea  Sleep apnea affects breathing during sleep. It causes breathing to stop for a short time or to become shallow. It can also increase the risk of:  · Heart attack.  · Stroke.  · Being very overweight (obese).  · Diabetes.  · Heart failure.  · Irregular heartbeat.  The goal of treatment is to help you breathe normally again.  What are the causes?  There are three kinds of sleep apnea:  · Obstructive sleep apnea. This is caused by a blocked or collapsed airway.  · Central sleep apnea. This happens when the brain does not send the right signals to the muscles that control breathing.  · Mixed sleep apnea. This is a combination of obstructive and central sleep apnea.  The most common cause of this condition is a collapsed or blocked airway. This can happen if:  · Your throat muscles are too relaxed.  · Your tongue and tonsils are too large.  · You are overweight.  · Your airway is too small.  What increases the risk?  · Being overweight.  · Smoking.  · Having a small airway.  · Being older.  · Being male.  · Drinking alcohol.  · Taking medicines to calm yourself (sedatives or tranquilizers).  · Having family members with the condition.  What are the signs or symptoms?  · Trouble staying asleep.  · Being sleepy or tired during the day.  · Getting angry a lot.  · Loud snoring.  · Headaches in the morning.  · Not being able to focus your mind (concentrate).  · Forgetting things.  · Less interest in sex.  · Mood swings.  · Personality changes.  · Feelings of sadness (depression).  · Waking up a lot during the night to pee (urinate).  · Dry mouth.  · Sore throat.  How is this diagnosed?  · Your medical history.  · A physical exam.  · A test that is done when you are sleeping (sleep study). The test is most often done in a sleep lab but may also be done at home.  How is this treated?    · Sleeping on your side.  · Using a medicine to get rid of mucus in your nose (decongestant).  · Avoiding the use of alcohol,  medicines to help you relax, or certain pain medicines (narcotics).  · Losing weight, if needed.  · Changing your diet.  · Not smoking.  · Using a machine to open your airway while you sleep, such as:  ? An oral appliance. This is a mouthpiece that shifts your lower jaw forward.  ? A CPAP device. This device blows air through a mask when you breathe out (exhale).  ? An EPAP device. This has valves that you put in each nostril.  ? A BPAP device. This device blows air through a mask when you breathe in (inhale) and breathe out.  · Having surgery if other treatments do not work.  It is important to get treatment for sleep apnea. Without treatment, it can lead to:  · High blood pressure.  · Coronary artery disease.  · In men, not being able to have an erection (impotence).  · Reduced thinking ability.  Follow these instructions at home:  Lifestyle  · Make changes that your doctor recommends.  · Eat a healthy diet.  · Lose weight if needed.  · Avoid alcohol, medicines to help you relax, and some pain medicines.  · Do not use any products that contain nicotine or tobacco, such as cigarettes, e-cigarettes, and chewing tobacco. If you need help quitting, ask your doctor.  General instructions  · Take over-the-counter and prescription medicines only as told by your doctor.  · If you were given a machine to use while you sleep, use it only as told by your doctor.  · If you are having surgery, make sure to tell your doctor you have sleep apnea. You may need to bring your device with you.  · Keep all follow-up visits as told by your doctor. This is important.  Contact a doctor if:  · The machine that you were given to use during sleep bothers you or does not seem to be working.  · You do not get better.  · You get worse.  Get help right away if:  · Your chest hurts.  · You have trouble breathing in enough air.  · You have an uncomfortable feeling in your back, arms, or stomach.  · You have trouble talking.  · One side of your  body feels weak.  · A part of your face is hanging down.  These symptoms may be an emergency. Do not wait to see if the symptoms will go away. Get medical help right away. Call your local emergency services (911 in the [place] Do not drive yourself to the hospital.  Summary  · This condition affects breathing during sleep.  · The most common cause is a collapsed or blocked airway.  · The goal of treatment is to help you breathe normally while you sleep.  This information is not intended to replace advice given to you by your health care provider. Make sure you discuss any questions you have with your health care provider.  Document Revised: 10/04/2019 Document Reviewed: 08/13/2019  [employer] Patient Education © 2021 [employer]

## 2021-03-23 NOTE — PROGRESS NOTES
New Sleep Patient Office Visit      Patient Name: Kulwant Arango  : 1965   MRN: [id number]     Referring Physician: Jaja Denson AP*    Chief Complaint:    Chief Complaint   Patient presents with   • Consult     NP, In office to establish care for kiran. Patient states he has been on his machine in 15 years.        History of Present Illness: Kulwant Arango is a 55 y.o. male who is here today to establish care with Sleep Medicine.  He is currently on PAP therapy and has had the same machine for 15 years.  Unable to obtain a current compliance report today.  Sleep questionnaire reviewed.  It takes him 10-15 minutes to fall asleep, he wakes up 3 times during the night, he feels okay when he wakes up, he sleeps 6-7 hours per night.  He snored and had excessive daytime sleepiness prior to starting PAP therapy.  Additional risk factors- BMI 32, dyslipidemia.     [place] Score: 11    Subjective      Review of Systems:   Review of Systems   Constitutional: Negative for fatigue, fever, unexpected weight gain and unexpected weight loss.   HENT: Negative for hearing loss, sore throat, swollen glands, tinnitus and trouble swallowing.    Eyes: Negative for blurred vision, double vision, photophobia and visual disturbance.   Respiratory: Positive for apnea. Negative for cough, chest tightness and shortness of breath.    Cardiovascular: Negative for chest pain, palpitations and leg swelling.   Gastrointestinal: Negative for constipation, diarrhea and nausea.   Endocrine: Negative for cold intolerance and heat intolerance.   Musculoskeletal: Negative for gait problem, neck pain and neck stiffness.   Skin: Negative for color change and rash.   Allergic/Immunologic: Negative for environmental allergies and food allergies.   Neurological: Negative for dizziness, syncope, facial asymmetry, speech difficulty, weakness, headache, memory problem and confusion.   Psychiatric/Behavioral: Negative for agitation, behavioral  "problems and depressed mood. The patient is not nervous/anxious.        Past Medical History:   Past Medical History:   Diagnosis Date   • Hyperlipidemia        Past Surgical History:   Past Surgical History:   Procedure Laterality Date   • LEG SURGERY         Family History:   Family History   Problem Relation Age of Onset   • Hypertension Mother    • Arthritis Mother    • Cancer Father    • Hyperlipidemia Father    • Diabetes Sister    • Anxiety disorder Brother    • Cancer Maternal Grandfather    • Diabetes Paternal Grandmother        Social History:   Social History     Socioeconomic History   • Marital status:      Spouse name: Not on file   • Number of children: Not on file   • Years of education: Not on file   • Highest education level: Not on file   Tobacco Use   • Smoking status: Former Smoker   • Smokeless tobacco: Current User   Substance and Sexual Activity   • Alcohol use: Yes     Alcohol/week: 6.0 standard drinks     Types: 5 Cans of beer, 1 Shots of liquor per week   • Drug use: No       Medications:     Current Outpatient Medications:   •  fenofibrate (TRICOR) 48 MG tablet, Take 1 tablet by mouth Daily., Disp: 90 tablet, Rfl: 1  •  metroNIDAZOLE (Metrogel) 1 % gel, Apply  topically to the appropriate area as directed Daily., Disp: 55 g, Rfl: 1  •  omeprazole (priLOSEC) 20 MG capsule, Take 1 capsule by mouth Daily., Disp: 90 capsule, Rfl: 1  •  sertraline (ZOLOFT) 50 MG tablet, Take 1 tablet by mouth Daily., Disp: 90 tablet, Rfl: 1    Allergies:   No Known Allergies    Objective     Physical Exam:  Vital Signs:   Vitals:    03/23/21 1406   BP: 130/80   BP Location: Left arm   Patient Position: Sitting   Cuff Size: Adult   Pulse: 75   Temp: 97.3 °F (36.3 °C)   SpO2: 96%   Weight: 110 kg (242 lb 6.4 oz)   Height: 182.9 cm (72\")   PainSc: 0-No pain     BMI: Body mass index is 32.88 kg/m².  Neck Circumference: 16in     Physical Exam  Vitals and nursing note reviewed.   Constitutional:       " General: He is not in acute distress.     Appearance: Normal appearance. He is well-developed. He is not diaphoretic.   HENT:      Head: Normocephalic and atraumatic.      Comments: Mallampati 2  Eyes:      Conjunctiva/sclera: Conjunctivae normal.      Pupils: Pupils are equal, round, and reactive to light.   Neck:      Thyroid: No thyroid mass or thyromegaly.      Vascular: Normal carotid pulses.      Trachea: Trachea normal.   Cardiovascular:      Rate and Rhythm: Normal rate and regular rhythm.      Heart sounds: Normal heart sounds. No murmur heard.   No friction rub. No gallop.    Pulmonary:      Effort: Pulmonary effort is normal. No respiratory distress.      Breath sounds: Normal breath sounds. No wheezing or rales.   Musculoskeletal:         General: Normal range of motion.   Skin:     General: Skin is warm and dry.      Findings: No rash.   Neurological:      Mental Status: He is alert and oriented to person, place, and time.   Psychiatric:         Mood and Affect: Mood normal.         Behavior: Behavior normal.         Thought Content: Thought content normal.         Judgment: Judgment normal.         Assessment / Plan      Assessment/Plan:   Diagnoses and all orders for this visit:    1. LIOR (obstructive sleep apnea) (Primary)  -     Home Sleep Study; Future. Offered PSG but patient prefers a home sleep study. Has a history of LIOR and currently on PAP therapy, most recent sleep study was 15 years ago so diagnostic test needed.   - Advised patient to avoid driving if drowsy.     2. Dyslipidemia  -     Home Sleep Study; Future    3. BMI 32.0-32.9,adult  -     Home Sleep Study; Future       Follow Up:   Return in about 3 months (around 6/23/2021) for F/U Obstructive Sleep Apnea.    I have advised the patient the need to continue the use of CPAP.  Gold standard for treatment of sleep apnea includes weight loss, use of cpap and avoidance of alcohol.  Untreated LIOR may increase the risk for development of  hypertension, stroke, myocardial infarction, diabetes, cardiovascular disease, work-related issues and driving accidents. I have counseled and advised the patient to avoid driving or operating heavy/dangerous equipment if feeling drowsy.     TRINA Junior, FNP-C  [facility] Neurology and Sleep Medicine       Please note that portions of this note may have been completed with a voice recognition program. Efforts were made to edit the dictations, but occasionally words are mistranscribed.

## 2021-04-12 ENCOUNTER — HOSPITAL ENCOUNTER (OUTPATIENT)
Dept: SLEEP MEDICINE | Facility: HOSPITAL | Age: 56
Discharge: HOME | End: 2021-04-12
Admitting: NURSE PRACTITIONER

## 2021-04-12 DIAGNOSIS — G47.33 OSA (OBSTRUCTIVE SLEEP APNEA): ICD-10-CM

## 2021-04-12 DIAGNOSIS — E78.5 DYSLIPIDEMIA: ICD-10-CM

## 2021-04-12 PROCEDURE — 95806 SLEEP STUDY UNATT&RESP EFFT: CPT | Performed by: INTERNAL MEDICINE

## 2021-04-12 PROCEDURE — 95806 SLEEP STUDY UNATT&RESP EFFT: CPT

## 2021-06-10 RX ORDER — EPINEPHRINE 0.3 MG/.3ML
0.3 INJECTION SUBCUTANEOUS ONCE
Qty: 1 EACH | Refills: 1 | Status: SHIPPED | OUTPATIENT
Start: 2021-06-10 | End: 2021-06-10

## 2021-06-29 ENCOUNTER — OFFICE VISIT (OUTPATIENT)
Dept: NEUROLOGY | Facility: CLINIC | Age: 56
End: 2021-06-29

## 2021-06-29 ENCOUNTER — TELEPHONE (OUTPATIENT)
Dept: NEUROLOGY | Facility: CLINIC | Age: 56
End: 2021-06-29

## 2021-06-29 VITALS
OXYGEN SATURATION: 97 % | WEIGHT: 234 LBS | BODY MASS INDEX: 31.69 KG/M2 | HEART RATE: 73 BPM | TEMPERATURE: 98.2 F | DIASTOLIC BLOOD PRESSURE: 70 MMHG | HEIGHT: 72 IN | SYSTOLIC BLOOD PRESSURE: 120 MMHG

## 2021-06-29 DIAGNOSIS — G47.33 OBSTRUCTIVE SLEEP APNEA: Primary | ICD-10-CM

## 2021-06-29 PROCEDURE — 99212 OFFICE O/P EST SF 10 MIN: CPT | Performed by: NURSE PRACTITIONER

## 2021-06-29 NOTE — TELEPHONE ENCOUNTER
Faxed order, note, sleep study to [facility] in [place].

## 2021-06-29 NOTE — PROGRESS NOTES
New Sleep Patient Office Visit      Patient Name: Kulwant Arango  : 1965   MRN: [id number]     Referring Physician: No ref. provider found    Chief Complaint:    Chief Complaint   Patient presents with   • Follow-up     Patient in office to follow up on kiran.        History of Present Illness: Kulwant Arango is a 55 y.o. male who is here today to follow up for KIRAN.  He is accompanied by his wife today.  He was last seen on 3/23/2021 and had been on PAP therapy for about 15 years.  He needed a new machine so he had to do a new sleep study.  Home sleep study on 2021 showed severe KIRAN with an AHI of 44/hour.  Currently on AutoPap 8/16cm, mean pressure 7.9 and peak average pressure 9.8, AHI 5.0/hour, compliance >4 hours 100%.  He is tolerating his pressures well.  He says he can't sleeping without his machine.  He is having some issues finding a great mask fit.  He is currently using a nasal mask.  Additional risk factors- BMI 31, dyslipidemia, GERD, mood disorder.   *Current compliance report has been requested for review.     Subjective      Review of Systems:   Review of Systems   Constitutional: Negative for chills, fatigue and fever.   HENT: Negative for facial swelling, hearing loss, sore throat, tinnitus and trouble swallowing.    Eyes: Negative for blurred vision, double vision, photophobia and visual disturbance.   Respiratory: Negative for cough, chest tightness and shortness of breath.    Cardiovascular: Negative for chest pain, palpitations and leg swelling.   Gastrointestinal: Negative for abdominal pain, nausea and vomiting.   Endocrine: Negative for cold intolerance and heat intolerance.   Musculoskeletal: Negative for gait problem, neck pain and neck stiffness.   Skin: Negative for color change and rash.   Allergic/Immunologic: Negative for environmental allergies and food allergies.   Neurological: Negative for dizziness, syncope, speech difficulty, weakness, light-headedness, numbness, headache  "and memory problem.   Psychiatric/Behavioral: Negative for behavioral problems, sleep disturbance and depressed mood. The patient is not nervous/anxious.        Past Medical History:   Past Medical History:   Diagnosis Date   • Hyperlipidemia        Past Surgical History:   Past Surgical History:   Procedure Laterality Date   • LEG SURGERY         Family History:   Family History   Problem Relation Age of Onset   • Hypertension Mother    • Arthritis Mother    • Cancer Father    • Hyperlipidemia Father    • Diabetes Sister    • Anxiety disorder Brother    • Cancer Maternal Grandfather    • Diabetes Paternal Grandmother        Social History:   Social History     Socioeconomic History   • Marital status:      Spouse name: Not on file   • Number of children: Not on file   • Years of education: Not on file   • Highest education level: Not on file   Tobacco Use   • Smoking status: Former Smoker   • Smokeless tobacco: Current User   Substance and Sexual Activity   • Alcohol use: Yes     Alcohol/week: 6.0 standard drinks     Types: 5 Cans of beer, 1 Shots of liquor per week   • Drug use: No       Medications:     Current Outpatient Medications:   •  fenofibrate (TRICOR) 48 MG tablet, Take 1 tablet by mouth Daily., Disp: 90 tablet, Rfl: 1  •  metroNIDAZOLE (Metrogel) 1 % gel, Apply  topically to the appropriate area as directed Daily., Disp: 55 g, Rfl: 1  •  omeprazole (priLOSEC) 20 MG capsule, Take 1 capsule by mouth Daily., Disp: 90 capsule, Rfl: 1  •  sertraline (ZOLOFT) 50 MG tablet, Take 1 tablet by mouth Daily., Disp: 90 tablet, Rfl: 1    Allergies:   No Known Allergies    Objective     Physical Exam:  Vital Signs:   Vitals:    06/29/21 1106   BP: 120/70   BP Location: Left arm   Patient Position: Sitting   Cuff Size: Adult   Pulse: 73   Temp: 98.2 °F (36.8 °C)   SpO2: 97%   Weight: 106 kg (234 lb)   Height: 182.9 cm (72\")   PainSc: 0-No pain     BMI: Body mass index is 31.74 kg/m².    Physical Exam  Vitals and " nursing note reviewed.   Constitutional:       General: He is not in acute distress.     Appearance: Normal appearance. He is well-developed. He is not diaphoretic.   HENT:      Head: Normocephalic and atraumatic.   Eyes:      Conjunctiva/sclera: Conjunctivae normal.      Pupils: Pupils are equal, round, and reactive to light.   Pulmonary:      Effort: Pulmonary effort is normal. No respiratory distress.   Musculoskeletal:         General: Normal range of motion.   Skin:     General: Skin is warm and dry.      Findings: No rash.   Neurological:      Mental Status: He is alert and oriented to person, place, and time.   Psychiatric:         Mood and Affect: Mood normal.         Behavior: Behavior normal.         Thought Content: Thought content normal.         Judgment: Judgment normal.         Assessment / Plan      Assessment/Plan:   Diagnoses and all orders for this visit:    1. Obstructive sleep apnea (Primary)    2. BMI 31.0-31.9,adult    *Advised patient to avoid driving if drowsy.   *Discussed recent [employer] recall with patient and discussed implications of untreated LIOR in regards to cardiovascular health and risk for stroke. I have advised patient to contact his DME and see if his machine has been recalled. Patient would like to use the [employer] here in [place] since [place] is further away for him.     Follow Up:   Return in about 1 year (around 6/29/2022) for F/U Obstructive Sleep Apnea.    I have advised the patient the need to continue the use of CPAP.  Gold standard for treatment of sleep apnea includes weight loss, use of cpap and avoidance of alcohol.  Untreated LIOR may increase the risk for development of hypertension, stroke, myocardial infarction, diabetes, cardiovascular disease, work-related issues and driving accidents. I have counseled and advised the patient to avoid driving or operating heavy/dangerous equipment if feeling drowsy.     TRINA Junior, FNP-C  [facility]  Neurology and Sleep Medicine       Please note that portions of this note may have been completed with a voice recognition program. Efforts were made to edit the dictations, but occasionally words are mistranscribed.

## 2021-06-29 NOTE — TELEPHONE ENCOUNTER
Provider: CASEY MCKEON   Caller: GABI   Relationship to Patient:WIFE  Phone Number: [phone]  Reason for Call: INFORMATION WAS TRANSFERRED FROM [facility] IN [place] BUT THEY NEED A CURRENT SLEEP STUDY, LAST OV NOTE AND ORDERS FAXED [phone]

## 2021-08-07 DIAGNOSIS — F41.9 ANXIETY: ICD-10-CM

## 2021-08-07 DIAGNOSIS — K21.9 GASTROESOPHAGEAL REFLUX DISEASE, UNSPECIFIED WHETHER ESOPHAGITIS PRESENT: ICD-10-CM

## 2021-08-09 RX ORDER — OMEPRAZOLE 20 MG/1
20 CAPSULE, DELAYED RELEASE ORAL DAILY
Qty: 90 CAPSULE | Refills: 0 | Status: SHIPPED | OUTPATIENT
Start: 2021-08-09 | End: 2022-01-05 | Stop reason: SDUPTHER

## 2021-11-05 DIAGNOSIS — F41.9 ANXIETY: ICD-10-CM

## 2021-12-01 ENCOUNTER — OFFICE VISIT (OUTPATIENT)
Dept: INTERNAL MEDICINE | Facility: CLINIC | Age: 56
End: 2021-12-01

## 2021-12-01 VITALS
TEMPERATURE: 97.9 F | OXYGEN SATURATION: 100 % | WEIGHT: 233 LBS | HEIGHT: 72 IN | SYSTOLIC BLOOD PRESSURE: 124 MMHG | DIASTOLIC BLOOD PRESSURE: 82 MMHG | BODY MASS INDEX: 31.56 KG/M2 | HEART RATE: 76 BPM

## 2021-12-01 DIAGNOSIS — Z02.4 ENCOUNTER FOR CDL (COMMERCIAL DRIVING LICENSE) EXAM: Primary | ICD-10-CM

## 2021-12-01 LAB
BILIRUB BLD-MCNC: NEGATIVE MG/DL
CLARITY, POC: CLEAR
COLOR UR: YELLOW
EXPIRATION DATE: NORMAL
GLUCOSE UR STRIP-MCNC: NEGATIVE MG/DL
KETONES UR QL: NEGATIVE
LEUKOCYTE EST, POC: NEGATIVE
Lab: NORMAL
NITRITE UR-MCNC: NEGATIVE MG/ML
PH UR: 6 [PH] (ref 5–8)
PROT UR STRIP-MCNC: NEGATIVE MG/DL
RBC # UR STRIP: NEGATIVE /UL
SP GR UR: 1.03 (ref 1–1.03)
UROBILINOGEN UR QL: NORMAL

## 2021-12-01 PROCEDURE — 81003 URINALYSIS AUTO W/O SCOPE: CPT | Performed by: FAMILY MEDICINE

## 2021-12-01 PROCEDURE — DOTPHY: Performed by: FAMILY MEDICINE

## 2021-12-01 NOTE — PROGRESS NOTES
"12/01/2021    Kulwant Arango presents for CDL exam.    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 Mr. Kulwant Arango has sleep apnea. Last note from sleep medicine states 100% compliance with CPAP.    Kulwant Arango's form has been reviewed and can be found in scanned media.    Review of Systems   Constitutional: Negative for chills, fatigue and fever.   HENT: Positive for rhinorrhea. Negative for congestion, postnasal drip and sore throat.    Eyes: Negative for pain and itching.   Respiratory: Negative for cough, shortness of breath and wheezing.    Cardiovascular: Negative for chest pain and leg swelling.   Gastrointestinal: Negative for abdominal pain, constipation, diarrhea, nausea and vomiting.   Genitourinary: Negative for difficulty urinating and dysuria.   Musculoskeletal: Negative for arthralgias.   Skin: Negative for color change and rash.   Allergic/Immunologic: Negative for environmental allergies.   Neurological: Negative for weakness, numbness and headaches.   Hematological: Does not bruise/bleed easily.   Psychiatric/Behavioral: Negative for dysphoric mood. The patient is not nervous/anxious.        Vitals:    12/01/21 0836   BP: 124/82   Pulse: 76   Temp: 97.9 °F (36.6 °C)   SpO2: 100%   Weight: 106 kg (233 lb)   Height: 182.9 cm (72.01\")     Body mass index is 31.59 kg/m².    Physical Exam  Constitutional:       General: He is not in acute distress.     Appearance: Normal appearance. He is well-developed.   HENT:      Head: Normocephalic and atraumatic.      Right Ear: Tympanic membrane and external ear normal.      Left Ear: Tympanic membrane and external ear normal.   Eyes:      Extraocular Movements: Extraocular movements intact.      Conjunctiva/sclera: Conjunctivae normal.   Cardiovascular:      Rate and Rhythm: Normal rate and regular rhythm.      Pulses: Normal pulses.      Heart sounds: No murmur heard.      Pulmonary:      " Effort: Pulmonary effort is normal. No respiratory distress.      Breath sounds: Normal breath sounds. No wheezing.   Abdominal:      General: Bowel sounds are normal. There is no distension.      Palpations: Abdomen is soft.      Tenderness: There is no abdominal tenderness.      Hernia: No hernia is present.   Musculoskeletal:      Cervical back: Normal range of motion and neck supple.      Right lower leg: No edema.      Left lower leg: No edema.   Lymphadenopathy:      Cervical: No cervical adenopathy.   Skin:     General: Skin is warm and dry.   Neurological:      Mental Status: He is alert and oriented to person, place, and time.      Cranial Nerves: No cranial nerve deficit.      Deep Tendon Reflexes: Reflexes normal.   Psychiatric:         Mood and Affect: Mood normal.         Behavior: Behavior normal.         Brief Urine Lab Results  (Last result in the past 365 days)      Color   Clarity   Blood   Leuk Est   Nitrite   Protein   CREAT   Urine HCG        12/01/21 0856 Yellow   Clear   Negative   Negative   Negative   Negative                 Vision: visual acuity better than 20/40, color vision intact, normal peripheral vision, without correction and no monocular vision.    Hearing: passed in both ears with forced whisper at 5 feet    Kulwant Arango Meets standards, but periodic monitoring required due to LIOR.   qualified only for 1 year.    · Follow up with PCP as needed/directed.  · See scanned form for further information.  · Data logged with [facility].    Maida Acuna DO

## 2022-01-05 ENCOUNTER — OFFICE VISIT (OUTPATIENT)
Dept: INTERNAL MEDICINE | Facility: CLINIC | Age: 57
End: 2022-01-05

## 2022-01-05 VITALS
HEART RATE: 73 BPM | WEIGHT: 234 LBS | SYSTOLIC BLOOD PRESSURE: 127 MMHG | HEIGHT: 72 IN | OXYGEN SATURATION: 100 % | BODY MASS INDEX: 31.69 KG/M2 | TEMPERATURE: 97.3 F | DIASTOLIC BLOOD PRESSURE: 88 MMHG | RESPIRATION RATE: 15 BRPM

## 2022-01-05 DIAGNOSIS — K21.9 GASTROESOPHAGEAL REFLUX DISEASE, UNSPECIFIED WHETHER ESOPHAGITIS PRESENT: ICD-10-CM

## 2022-01-05 DIAGNOSIS — R53.83 FATIGUE, UNSPECIFIED TYPE: Primary | ICD-10-CM

## 2022-01-05 DIAGNOSIS — E78.2 MIXED HYPERLIPIDEMIA: ICD-10-CM

## 2022-01-05 DIAGNOSIS — R73.09 ELEVATED GLUCOSE: ICD-10-CM

## 2022-01-05 DIAGNOSIS — N52.9 DIFFICULTY ATTAINING ERECTION: ICD-10-CM

## 2022-01-05 DIAGNOSIS — F41.9 ANXIETY: ICD-10-CM

## 2022-01-05 LAB
ALBUMIN SERPL-MCNC: 5.2 G/DL (ref 3.5–5.2)
ALBUMIN/GLOB SERPL: 2.3 G/DL
ALP SERPL-CCNC: 54 U/L (ref 39–117)
ALT SERPL-CCNC: 67 U/L (ref 1–41)
AST SERPL-CCNC: 42 U/L (ref 1–40)
BASOPHILS # BLD AUTO: 0.04 10*3/MM3 (ref 0–0.2)
BASOPHILS NFR BLD AUTO: 0.9 % (ref 0–1.5)
BILIRUB SERPL-MCNC: 0.6 MG/DL (ref 0–1.2)
BUN SERPL-MCNC: 14 MG/DL (ref 6–20)
BUN/CREAT SERPL: 12.6 (ref 7–25)
CALCIUM SERPL-MCNC: 9.8 MG/DL (ref 8.6–10.5)
CHLORIDE SERPL-SCNC: 103 MMOL/L (ref 98–107)
CHOLEST SERPL-MCNC: 203 MG/DL (ref 0–200)
CO2 SERPL-SCNC: 28.1 MMOL/L (ref 22–29)
CREAT SERPL-MCNC: 1.11 MG/DL (ref 0.76–1.27)
EOSINOPHIL # BLD AUTO: 0.16 10*3/MM3 (ref 0–0.4)
EOSINOPHIL NFR BLD AUTO: 3.8 % (ref 0.3–6.2)
ERYTHROCYTE [DISTWIDTH] IN BLOOD BY AUTOMATED COUNT: 12.5 % (ref 12.3–15.4)
GLOBULIN SER CALC-MCNC: 2.3 GM/DL
GLUCOSE SERPL-MCNC: 111 MG/DL (ref 65–99)
HBA1C MFR BLD: 5.6 % (ref 4.8–5.6)
HCT VFR BLD AUTO: 46.8 % (ref 37.5–51)
HDLC SERPL-MCNC: 45 MG/DL (ref 40–60)
HGB BLD-MCNC: 15.8 G/DL (ref 13–17.7)
IMM GRANULOCYTES # BLD AUTO: 0.02 10*3/MM3 (ref 0–0.05)
IMM GRANULOCYTES NFR BLD AUTO: 0.5 % (ref 0–0.5)
LDLC SERPL CALC-MCNC: 129 MG/DL (ref 0–100)
LYMPHOCYTES # BLD AUTO: 0.96 10*3/MM3 (ref 0.7–3.1)
LYMPHOCYTES NFR BLD AUTO: 22.6 % (ref 19.6–45.3)
MCH RBC QN AUTO: 29.3 PG (ref 26.6–33)
MCHC RBC AUTO-ENTMCNC: 33.8 G/DL (ref 31.5–35.7)
MCV RBC AUTO: 86.7 FL (ref 79–97)
MONOCYTES # BLD AUTO: 0.53 10*3/MM3 (ref 0.1–0.9)
MONOCYTES NFR BLD AUTO: 12.5 % (ref 5–12)
NEUTROPHILS # BLD AUTO: 2.53 10*3/MM3 (ref 1.7–7)
NEUTROPHILS NFR BLD AUTO: 59.7 % (ref 42.7–76)
NRBC BLD AUTO-RTO: 0 /100 WBC (ref 0–0.2)
PLATELET # BLD AUTO: 244 10*3/MM3 (ref 140–450)
POTASSIUM SERPL-SCNC: 4.5 MMOL/L (ref 3.5–5.2)
PROT SERPL-MCNC: 7.5 G/DL (ref 6–8.5)
RBC # BLD AUTO: 5.4 10*6/MM3 (ref 4.14–5.8)
SODIUM SERPL-SCNC: 141 MMOL/L (ref 136–145)
T4 FREE SERPL-MCNC: 0.99 NG/DL (ref 0.93–1.7)
TRIGL SERPL-MCNC: 165 MG/DL (ref 0–150)
TSH SERPL DL<=0.005 MIU/L-ACNC: 3.83 UIU/ML (ref 0.27–4.2)
VIT B12 SERPL-MCNC: 1005 PG/ML (ref 211–946)
VLDLC SERPL CALC-MCNC: 29 MG/DL (ref 5–40)
WBC # BLD AUTO: 4.24 10*3/MM3 (ref 3.4–10.8)

## 2022-01-05 PROCEDURE — 99214 OFFICE O/P EST MOD 30 MIN: CPT | Performed by: NURSE PRACTITIONER

## 2022-01-05 RX ORDER — BUPROPION HYDROCHLORIDE 150 MG/1
150 TABLET ORAL DAILY
Qty: 30 TABLET | Refills: 1 | Status: SHIPPED | OUTPATIENT
Start: 2022-01-05 | End: 2022-01-10

## 2022-01-05 RX ORDER — FENOFIBRATE 48 MG/1
48 TABLET, COATED ORAL DAILY
Qty: 90 TABLET | Refills: 1 | Status: SHIPPED | OUTPATIENT
Start: 2022-01-05 | End: 2022-06-14 | Stop reason: SDUPTHER

## 2022-01-05 RX ORDER — OMEPRAZOLE 20 MG/1
20 CAPSULE, DELAYED RELEASE ORAL DAILY
Qty: 90 CAPSULE | Refills: 0 | Status: SHIPPED | OUTPATIENT
Start: 2022-01-05 | End: 2022-02-03

## 2022-01-10 DIAGNOSIS — Z72.0 CHEWS TOBACCO: ICD-10-CM

## 2022-01-10 DIAGNOSIS — F41.9 ANXIETY: Primary | ICD-10-CM

## 2022-01-10 RX ORDER — BUPROPION HYDROCHLORIDE 75 MG/1
75 TABLET ORAL 2 TIMES DAILY
Qty: 60 TABLET | Refills: 1 | Status: SHIPPED | OUTPATIENT
Start: 2022-01-10 | End: 2022-03-24

## 2022-01-19 NOTE — PROGRESS NOTES
Chief Complaint / Reason:      Chief Complaint   Patient presents with   • Hyperlipidemia   • Anxiety       Subjective     HPI  Patient presents today with complaints of anxiety and difficulty maintaining erection.  He denies SI or HI but states that he feels like the medication has helped.  He does report fatigue and is requesting medication refills.  Patient continues to chew tobacco and states he is not ready to quit.  History taken from: patient    PMH/FH/Social History were reviewed and updated appropriately in the electronic medical record.   Past Medical History:   Diagnosis Date   • Anxiety    • Hyperlipidemia    • LIOR (obstructive sleep apnea)      Past Surgical History:   Procedure Laterality Date   • LEG SURGERY       Social History     Socioeconomic History   • Marital status:    Tobacco Use   • Smoking status: Former Smoker     Types: Cigarettes   • Smokeless tobacco: Current User   Substance and Sexual Activity   • Alcohol use: Yes     Alcohol/week: 6.0 standard drinks     Types: 5 Cans of beer, 1 Shots of liquor per week   • Drug use: No     Family History   Problem Relation Age of Onset   • Hypertension Mother    • Arthritis Mother    • Cancer Father    • Hyperlipidemia Father    • Diabetes Sister    • Anxiety disorder Brother    • Cancer Maternal Grandfather    • Diabetes Paternal Grandmother        Review of Systems:   Review of Systems   Constitutional: Positive for fatigue.   HENT: Negative.    Respiratory: Negative.    Cardiovascular: Negative.    Genitourinary: Positive for erectile dysfunction.   Musculoskeletal: Positive for arthralgias.   Allergic/Immunologic: Positive for environmental allergies.   Neurological: Negative.    Psychiatric/Behavioral: Positive for sleep disturbance, depressed mood and stress.         All other systems were reviewed and are negative.  Exceptions are noted in the subjective or above.      Objective     Vital Signs  Vitals:    01/05/22 0911   BP: 127/88    Pulse: 73   Resp: 15   Temp: 97.3 °F (36.3 °C)   SpO2: 100%       Body mass index is 31.74 kg/m².    Physical Exam  Vitals and nursing note reviewed.   Constitutional:       Appearance: He is well-developed.   Cardiovascular:      Rate and Rhythm: Normal rate and regular rhythm.      Pulses: Normal pulses.      Heart sounds: Normal heart sounds.   Pulmonary:      Effort: Pulmonary effort is normal.      Breath sounds: Normal breath sounds.   Chest:      Chest wall: No tenderness.   Skin:     General: Skin is warm and dry.      Capillary Refill: Capillary refill takes less than 2 seconds.   Neurological:      Mental Status: He is alert and oriented to person, place, and time.   Psychiatric:         Behavior: Behavior normal.         Thought Content: Thought content normal.         Judgment: Judgment normal.              Results Review:    I reviewed the patient's previous clinical results.       Medication Review:     Current Outpatient Medications:   •  fenofibrate (TRICOR) 48 MG tablet, Take 1 tablet by mouth Daily., Disp: 90 tablet, Rfl: 1  •  omeprazole (priLOSEC) 20 MG capsule, Take 1 capsule by mouth Daily., Disp: 90 capsule, Rfl: 0  •  sertraline (ZOLOFT) 50 MG tablet, Take 1 tablet by mouth Daily., Disp: 90 tablet, Rfl: 1  •  buPROPion (WELLBUTRIN) 75 MG tablet, Take 1 tablet by mouth 2 (Two) Times a Day., Disp: 60 tablet, Rfl: 1  •  metroNIDAZOLE (Metrogel) 1 % gel, Apply  topically to the appropriate area as directed Daily., Disp: 55 g, Rfl: 1    Diagnoses and all orders for this visit:    Fatigue, unspecified type  -     Comprehensive metabolic panel  -     CBC w AUTO Differential  -     Vitamin B12  -     TSH  -     T4, free  Discussed sleep hygiene  Mixed hyperlipidemia  -     fenofibrate (TRICOR) 48 MG tablet; Take 1 tablet by mouth Daily.  -     Lipid panel    Gastroesophageal reflux disease, unspecified whether esophagitis present  -     omeprazole (priLOSEC) 20 MG capsule; Take 1 capsule by mouth  Daily.  Avoid eating spicy foods  Avoid caffeinated beverages  Avoid carbonated beverages  Do not eat or drink within 2-3 hours of going to bed in the evening  Elevate head of bed on 4-6 inch blocks  Eat smaller portions of food throughout the day    Difficulty attaining erection   -    buPROPion XL (Wellbutrin XL) 150 MG 24 hr tablet; Take 1 tablet by mouth Daily.  Discussed medication options dosage side effects and indications.  Will prescribe another medication but will try this at first.  anxiety  -     sertraline (ZOLOFT) 50 MG tablet; Take 1 tablet by mouth Daily.    Discussed medication options dosage side effects and indications    Elevated glucose  -     Hemoglobin A1c          Return in about 4 weeks (around 2/2/2022), or if symptoms worsen or fail to improve.    Jaja Denson, APRN  01/05/2022

## 2022-02-03 DIAGNOSIS — K21.9 GASTROESOPHAGEAL REFLUX DISEASE, UNSPECIFIED WHETHER ESOPHAGITIS PRESENT: ICD-10-CM

## 2022-02-03 RX ORDER — OMEPRAZOLE 20 MG/1
20 CAPSULE, DELAYED RELEASE ORAL DAILY
Qty: 90 CAPSULE | Refills: 0 | Status: SHIPPED | OUTPATIENT
Start: 2022-02-03

## 2022-03-24 ENCOUNTER — OFFICE VISIT (OUTPATIENT)
Dept: INTERNAL MEDICINE | Facility: CLINIC | Age: 57
End: 2022-03-24

## 2022-03-24 VITALS
TEMPERATURE: 98 F | HEIGHT: 72 IN | DIASTOLIC BLOOD PRESSURE: 78 MMHG | RESPIRATION RATE: 15 BRPM | HEART RATE: 70 BPM | BODY MASS INDEX: 31.15 KG/M2 | SYSTOLIC BLOOD PRESSURE: 134 MMHG | WEIGHT: 230 LBS | OXYGEN SATURATION: 100 %

## 2022-03-24 DIAGNOSIS — K21.9 GASTROESOPHAGEAL REFLUX DISEASE, UNSPECIFIED WHETHER ESOPHAGITIS PRESENT: ICD-10-CM

## 2022-03-24 DIAGNOSIS — Z72.0 CHEWS TOBACCO: ICD-10-CM

## 2022-03-24 DIAGNOSIS — G47.30 SLEEP APNEA WITH USE OF CONTINUOUS POSITIVE AIRWAY PRESSURE (CPAP): ICD-10-CM

## 2022-03-24 DIAGNOSIS — F41.9 ANXIETY: ICD-10-CM

## 2022-03-24 DIAGNOSIS — N52.9 ERECTILE DYSFUNCTION, UNSPECIFIED ERECTILE DYSFUNCTION TYPE: ICD-10-CM

## 2022-03-24 DIAGNOSIS — R00.2 HEART PALPITATIONS: Primary | ICD-10-CM

## 2022-03-24 PROCEDURE — 99214 OFFICE O/P EST MOD 30 MIN: CPT | Performed by: NURSE PRACTITIONER

## 2022-03-24 PROCEDURE — 93000 ELECTROCARDIOGRAM COMPLETE: CPT | Performed by: NURSE PRACTITIONER

## 2022-03-24 RX ORDER — FAMOTIDINE 20 MG/1
20 TABLET, FILM COATED ORAL 2 TIMES DAILY PRN
Qty: 60 TABLET | Refills: 1 | Status: SHIPPED | OUTPATIENT
Start: 2022-03-24 | End: 2022-06-07

## 2022-03-24 RX ORDER — TADALAFIL 10 MG/1
10 TABLET ORAL DAILY PRN
Qty: 6 TABLET | Refills: 1 | Status: SHIPPED | OUTPATIENT
Start: 2022-03-24 | End: 2022-06-24

## 2022-06-07 DIAGNOSIS — K21.9 GASTROESOPHAGEAL REFLUX DISEASE, UNSPECIFIED WHETHER ESOPHAGITIS PRESENT: ICD-10-CM

## 2022-06-07 RX ORDER — FAMOTIDINE 20 MG/1
20 TABLET, FILM COATED ORAL 2 TIMES DAILY PRN
Qty: 60 TABLET | Refills: 1 | Status: SHIPPED | OUTPATIENT
Start: 2022-06-07 | End: 2022-06-16 | Stop reason: SDUPTHER

## 2022-06-14 DIAGNOSIS — E78.2 MIXED HYPERLIPIDEMIA: ICD-10-CM

## 2022-06-14 DIAGNOSIS — F41.9 ANXIETY: ICD-10-CM

## 2022-06-14 RX ORDER — FENOFIBRATE 48 MG/1
48 TABLET, COATED ORAL DAILY
Qty: 90 TABLET | Refills: 1 | Status: SHIPPED | OUTPATIENT
Start: 2022-06-14 | End: 2022-06-16 | Stop reason: SDUPTHER

## 2022-06-16 DIAGNOSIS — K21.9 GASTROESOPHAGEAL REFLUX DISEASE, UNSPECIFIED WHETHER ESOPHAGITIS PRESENT: ICD-10-CM

## 2022-06-16 DIAGNOSIS — F41.9 ANXIETY: ICD-10-CM

## 2022-06-16 DIAGNOSIS — E78.2 MIXED HYPERLIPIDEMIA: ICD-10-CM

## 2022-06-16 RX ORDER — FAMOTIDINE 20 MG/1
20 TABLET, FILM COATED ORAL 2 TIMES DAILY PRN
Qty: 60 TABLET | Refills: 1 | Status: SHIPPED | OUTPATIENT
Start: 2022-06-16 | End: 2022-08-01

## 2022-06-16 RX ORDER — FENOFIBRATE 48 MG/1
48 TABLET, COATED ORAL DAILY
Qty: 90 TABLET | Refills: 1 | Status: SHIPPED | OUTPATIENT
Start: 2022-06-16 | End: 2022-11-23

## 2022-06-24 DIAGNOSIS — N52.9 ERECTILE DYSFUNCTION, UNSPECIFIED ERECTILE DYSFUNCTION TYPE: ICD-10-CM

## 2022-06-24 RX ORDER — TADALAFIL 10 MG/1
10 TABLET ORAL DAILY PRN
Qty: 6 TABLET | Refills: 1 | Status: SHIPPED | OUTPATIENT
Start: 2022-06-24

## 2022-06-30 ENCOUNTER — OFFICE VISIT (OUTPATIENT)
Dept: NEUROLOGY | Facility: CLINIC | Age: 57
End: 2022-06-30

## 2022-06-30 ENCOUNTER — TELEPHONE (OUTPATIENT)
Dept: NEUROLOGY | Facility: CLINIC | Age: 57
End: 2022-06-30

## 2022-06-30 VITALS
BODY MASS INDEX: 31.23 KG/M2 | WEIGHT: 230.6 LBS | SYSTOLIC BLOOD PRESSURE: 122 MMHG | HEART RATE: 75 BPM | DIASTOLIC BLOOD PRESSURE: 80 MMHG | HEIGHT: 72 IN | OXYGEN SATURATION: 98 % | TEMPERATURE: 98 F

## 2022-06-30 DIAGNOSIS — G47.33 OBSTRUCTIVE SLEEP APNEA: Primary | ICD-10-CM

## 2022-06-30 PROCEDURE — 99213 OFFICE O/P EST LOW 20 MIN: CPT | Performed by: NURSE PRACTITIONER

## 2022-06-30 NOTE — TELEPHONE ENCOUNTER
Provider: TRINA SAINI    Caller: GABI  Relationship to Patient: WIFE    Phone Number: [phone]    Reason for Call: PT IS 10 MINUTES OUT.  STATED THEY HAD CALLED THE OFFICE D/T ACCIDENT ON [address] AND NOW STUCK IN TRAFFIC.  CALLED S/W ALLIE AT CLINIC AND APPROVED PT TO COME ON IN.

## 2022-06-30 NOTE — PROGRESS NOTES
Follow Up Office Visit      Patient Name: Kuwlant Arango  : 1965   MRN: [id number]     Chief Complaint:    Chief Complaint   Patient presents with   • Follow-up     Patient in office to follow up on kiran.          History of Present Illness: Kulwant Arango is a 56 y.o. male who is here today to follow up with KIRAN and was last seen on 2021.  He has received a new PAP machine since his previous one was included in the [employer] recall of .  He is using his machine every night for at least 5 hours.  He says he can't sleep without his machine.  His last setting was on AutoPap therapy.  He does feel his pressure could be increased some- feels as if it isn't enough at times.  He is using a nasal mask.  Additional risk factors- BMI 31, dyslipidemia, GERD, mood disorder.   *A current compliance report was requested for review but could not be obtained.  He needs to take his new machine to [facility] ([place] so it can be registered.     Following taken from previous visit note:  Kulwant Arango is a 55 y.o. male who is here today to follow up for KIRAN.  He is accompanied by his wife today.  He was last seen on 3/23/2021 and had been on PAP therapy for about 15 years.  He needed a new machine so he had to do a new sleep study.  Home sleep study on 2021 showed severe KIRAN with an AHI of 44/hour.  Currently on AutoPap 8/16cm, mean pressure 7.9 and peak average pressure 9.8, AHI 5.0/hour, compliance >4 hours 100%.  He is tolerating his pressures well.  He says he can't sleeping without his machine.  He is having some issues finding a great mask fit.  He is currently using a nasal mask.  Additional risk factors- BMI 31, dyslipidemia, GERD, mood disorder.   *Current compliance report has been requested for review.     Subjective      Review of Systems:   Review of Systems   Constitutional: Negative for chills, fatigue and fever.   HENT: Negative for facial swelling, hearing loss, sore throat, tinnitus and trouble  swallowing.    Eyes: Negative for blurred vision, double vision, photophobia and visual disturbance.   Respiratory: Positive for apnea. Negative for cough, chest tightness and shortness of breath.    Cardiovascular: Negative for chest pain, palpitations and leg swelling.   Gastrointestinal: Negative for abdominal pain, nausea and vomiting.   Endocrine: Negative for cold intolerance and heat intolerance.   Musculoskeletal: Negative for gait problem, neck pain and neck stiffness.   Skin: Negative for color change and rash.   Allergic/Immunologic: Negative for environmental allergies and food allergies.   Neurological: Negative for dizziness, syncope, speech difficulty, weakness, light-headedness, numbness, headache and memory problem.   Psychiatric/Behavioral: Negative for behavioral problems, sleep disturbance and depressed mood. The patient is not nervous/anxious.        I have reviewed and the following portions of the patient's history were updated as appropriate: past family history, past medical history, past social history, past surgical history and problem list.    Medications:     Current Outpatient Medications:   •  famotidine (PEPCID) 20 MG tablet, Take 1 tablet by mouth 2 (Two) Times a Day As Needed for Indigestion or Heartburn., Disp: 60 tablet, Rfl: 1  •  fenofibrate (TRICOR) 48 MG tablet, Take 1 tablet by mouth Daily., Disp: 90 tablet, Rfl: 1  •  omeprazole (priLOSEC) 20 MG capsule, TAKE 1 CAPSULE BY MOUTH DAILY, Disp: 90 capsule, Rfl: 0  •  sertraline (ZOLOFT) 50 MG tablet, Take 1 tablet by mouth Daily., Disp: 90 tablet, Rfl: 1  •  tadalafil (CIALIS) 10 MG tablet, TAKE 1 TABLET BY MOUTH DAILY AS NEEDED FOR ERECTILE DYSFUNCTION., Disp: 6 tablet, Rfl: 1    Allergies:   No Known Allergies    Objective     Physical Exam:  Vital Signs:   Vitals:    06/30/22 1344   BP: 122/80   BP Location: Right arm   Patient Position: Sitting   Cuff Size: Adult   Pulse: 75   Temp: 98 °F (36.7 °C)   SpO2: 98%   Weight: 105  "kg (230 lb 9.6 oz)   Height: 182.9 cm (72\")   PainSc: 0-No pain     Body mass index is 31.27 kg/m².    Physical Exam  Vitals and nursing note reviewed.   Constitutional:       General: He is not in acute distress.     Appearance: Normal appearance. He is well-developed. He is not diaphoretic.   HENT:      Head: Normocephalic and atraumatic.   Eyes:      Extraocular Movements: Extraocular movements intact.      Conjunctiva/sclera: Conjunctivae normal.      Pupils: Pupils are equal, round, and reactive to light.   Pulmonary:      Effort: Pulmonary effort is normal. No respiratory distress.   Musculoskeletal:         General: Normal range of motion.   Skin:     General: Skin is warm and dry.      Findings: No rash.   Neurological:      Mental Status: He is alert and oriented to person, place, and time.   Psychiatric:         Mood and Affect: Mood normal.         Behavior: Behavior normal.         Thought Content: Thought content normal.         Judgment: Judgment normal.         Neurologic Exam     Mental Status   Oriented to person, place, and time.     Cranial Nerves     CN III, IV, VI   Pupils are equal, round, and reactive to light.       Assessment / Plan      Assessment/Plan:   Diagnoses and all orders for this visit:    1. Obstructive sleep apnea (Primary)    2. BMI 31.0-31.9,adult    *He is agreeable to bringing his machine back to [facility] next week. I will check compliance report around July 11th and see what his AHI is.   *Advised patient to avoid driving if drowsy.     Follow Up:   Return in about 1 year (around 6/30/2023) for F/U Obstructive Sleep Apnea.    TRINA Junior, FNP-C  [facility]rology [facility]       Please note that portions of this note may have been completed with a voice recognition program. Efforts were made to edit the dictations, but occasionally words are mistranscribed.   "

## 2022-08-01 DIAGNOSIS — K21.9 GASTROESOPHAGEAL REFLUX DISEASE, UNSPECIFIED WHETHER ESOPHAGITIS PRESENT: ICD-10-CM

## 2022-08-01 RX ORDER — FAMOTIDINE 20 MG/1
TABLET, FILM COATED ORAL
Qty: 60 TABLET | Refills: 1 | Status: SHIPPED | OUTPATIENT
Start: 2022-08-01 | End: 2022-12-19 | Stop reason: SDUPTHER

## 2022-11-23 DIAGNOSIS — E78.2 MIXED HYPERLIPIDEMIA: ICD-10-CM

## 2022-11-23 RX ORDER — FENOFIBRATE 48 MG/1
TABLET, COATED ORAL
Qty: 90 TABLET | Refills: 1 | Status: SHIPPED | OUTPATIENT
Start: 2022-11-23

## 2022-12-19 DIAGNOSIS — K21.9 GASTROESOPHAGEAL REFLUX DISEASE, UNSPECIFIED WHETHER ESOPHAGITIS PRESENT: ICD-10-CM

## 2022-12-19 RX ORDER — FAMOTIDINE 20 MG/1
20 TABLET, FILM COATED ORAL 2 TIMES DAILY
Qty: 60 TABLET | Refills: 5 | Status: SHIPPED | OUTPATIENT
Start: 2022-12-19

## 2022-12-30 DIAGNOSIS — F41.9 ANXIETY: ICD-10-CM

## 2023-01-03 NOTE — TELEPHONE ENCOUNTER
Rx Refill Note  Requested Prescriptions     Pending Prescriptions Disp Refills    sertraline (ZOLOFT) 50 MG tablet [Pharmacy Med Name: SERTRALINE HCL 50 MG Tablet] 90 tablet 1     Sig: TAKE 1 TABLET EVERY DAY      Last office visit with prescribing clinician: 3/24/2022   Last telemedicine visit with prescribing clinician: Visit date not found   Next office visit with prescribing clinician: Visit date not found   {

## 2023-04-13 ENCOUNTER — OFFICE VISIT (OUTPATIENT)
Dept: INTERNAL MEDICINE | Facility: CLINIC | Age: 58
End: 2023-04-13
Payer: MEDICARE

## 2023-04-13 VITALS
RESPIRATION RATE: 16 BRPM | HEART RATE: 57 BPM | WEIGHT: 218 LBS | DIASTOLIC BLOOD PRESSURE: 80 MMHG | SYSTOLIC BLOOD PRESSURE: 117 MMHG | TEMPERATURE: 97.8 F | OXYGEN SATURATION: 98 % | BODY MASS INDEX: 29.53 KG/M2 | HEIGHT: 72 IN

## 2023-04-13 DIAGNOSIS — Z12.5 SCREENING PSA (PROSTATE SPECIFIC ANTIGEN): ICD-10-CM

## 2023-04-13 DIAGNOSIS — R73.09 ELEVATED GLUCOSE: ICD-10-CM

## 2023-04-13 DIAGNOSIS — M25.561 CHRONIC PAIN OF RIGHT KNEE: ICD-10-CM

## 2023-04-13 DIAGNOSIS — E78.2 MIXED HYPERLIPIDEMIA: ICD-10-CM

## 2023-04-13 DIAGNOSIS — F41.9 ANXIETY: ICD-10-CM

## 2023-04-13 DIAGNOSIS — Z72.0 CHEWS TOBACCO: ICD-10-CM

## 2023-04-13 DIAGNOSIS — R53.83 FATIGUE, UNSPECIFIED TYPE: ICD-10-CM

## 2023-04-13 DIAGNOSIS — G89.29 CHRONIC PAIN OF RIGHT KNEE: ICD-10-CM

## 2023-04-13 DIAGNOSIS — K21.9 GASTROESOPHAGEAL REFLUX DISEASE, UNSPECIFIED WHETHER ESOPHAGITIS PRESENT: ICD-10-CM

## 2023-04-13 DIAGNOSIS — Z12.11 SCREEN FOR COLON CANCER: ICD-10-CM

## 2023-04-13 DIAGNOSIS — G47.30 SLEEP APNEA WITH USE OF CONTINUOUS POSITIVE AIRWAY PRESSURE (CPAP): ICD-10-CM

## 2023-04-13 DIAGNOSIS — Z83.2 FAMILY HISTORY OF PROTEIN S DEFICIENCY: ICD-10-CM

## 2023-04-13 DIAGNOSIS — Z00.00 MEDICARE ANNUAL WELLNESS VISIT, SUBSEQUENT: Primary | ICD-10-CM

## 2023-04-13 DIAGNOSIS — E66.3 OVERWEIGHT (BMI 25.0-29.9): ICD-10-CM

## 2023-04-13 PROCEDURE — 1159F MED LIST DOCD IN RCRD: CPT | Performed by: NURSE PRACTITIONER

## 2023-04-13 PROCEDURE — G0439 PPPS, SUBSEQ VISIT: HCPCS | Performed by: NURSE PRACTITIONER

## 2023-04-13 PROCEDURE — 96160 PT-FOCUSED HLTH RISK ASSMT: CPT | Performed by: NURSE PRACTITIONER

## 2023-04-13 PROCEDURE — 1160F RVW MEDS BY RX/DR IN RCRD: CPT | Performed by: NURSE PRACTITIONER

## 2023-04-13 PROCEDURE — 99396 PREV VISIT EST AGE 40-64: CPT | Performed by: NURSE PRACTITIONER

## 2023-04-13 NOTE — PROGRESS NOTES
The ABCs of the Annual Wellness Visit  Subsequent Medicare Wellness Visit    Chief Complaint   Patient presents with   • Medicare Wellness-subsequent       Subjective   History of Present Illness:  Kulwant Arango is a 57 y.o. male who presents for a Subsequent Medicare Wellness Visit and annual physical examination.  [facility]  Patient is requesting labs to see if he has protein S as he states he does have family history.  Patient continues to have right knee pain and states that it does radiate down his leg and up.  He states that he is not able to squat and does notice that it feels like it is going to give out.  And it has been popping a lot more than normal.  Denies any recent falls and he has lost weight but states that the weight loss has not alleviated any of his symptoms.    HEALTH RISK ASSESSMENT    Recent Hospitalizations:  No hospitalization(s) within the last year.    Current Medical Providers:  Patient Care Team:  Jaja Denson APRN as PCP - General (Family Medicine)    Smoking Status:  Social History     Tobacco Use   Smoking Status Former   • Types: Cigarettes   Smokeless Tobacco Current       Alcohol Consumption:  Social History     Substance and Sexual Activity   Alcohol Use Yes   • Alcohol/week: 6.0 standard drinks   • Types: 5 Cans of beer, 1 Shots of liquor per week       Depression Screen:       4/13/2023     8:00 AM   PHQ-2/PHQ-9 Depression Screening   Little Interest or Pleasure in Doing Things 0-->not at all   Feeling Down, Depressed or Hopeless 0-->not at all   Trouble Falling or Staying Asleep, or Sleeping Too Much 1-->several days   Feeling Tired or Having Little Energy 1-->several days   Poor Appetite or Overeating 0-->not at all   Feeling Bad about Yourself - or that You are a Failure or Have Let Yourself or Your Family Down 0-->not at all   Trouble Concentrating on Things, Such as Reading the Newspaper or Watching Television 0-->not at  all   Moving or Speaking So Slowly that Other People Could Have Noticed? Or the Opposite - Being So Fidgety 0-->not at all   Thoughts that You Would be Better Off Dead or of Hurting Yourself in Some Way 0-->not at all   PHQ-9: Brief Depression Severity Measure Score 2   If You Checked Off Any Problems, How Difficult Have These Problems Made It For You to Do Your Work, Take Care of Things at Home, or Get Along with Other People? not difficult at all       Fall Risk Screen:  BRANDEN Fall Risk Assessment was completed, and patient is at LOW risk for falls.Assessment completed on:2023    Health Habits and Functional and Cognitive Screenin/13/2023     8:08 AM   Functional & Cognitive Status   Do you have difficulty preparing food and eating? No   Do you have difficulty bathing yourself, getting dressed or grooming yourself? No   Do you have difficulty using the toilet? No   Do you have difficulty moving around from place to place? No   Do you have trouble with steps or getting out of a bed or a chair? No   Current Diet Well Balanced Diet   Dental Exam Up to date   Eye Exam Up to date   Exercise (times per week) 7 times per week   Current Exercises Include Walking   Do you need help using the phone?  No   Are you deaf or do you have serious difficulty hearing?  Yes   Do you need help with transportation? No   Do you need help shopping? No   Do you need help preparing meals?  No   Do you need help with housework?  No   Do you need help with laundry? No   Do you need help taking your medications? No   Do you need help managing money? No   Do you ever drive or ride in a car without wearing a seat belt? Yes   Have you felt unusual stress, anger or loneliness in the last month? No   Who do you live with? Spouse   If you need help, do you have trouble finding someone available to you? No   Have you been bothered in the last four weeks by sexual problems? No   Do you have difficulty concentrating, remembering or  making decisions? No         Does the patient have evidence of cognitive impairment? No    Asprin use counseling:Does not need ASA (and currently is not on it)    Age-appropriate Screening Schedule:  Refer to the list below for future screening recommendations based on patient's age, sex and/or medical conditions. Orders for these recommended tests are listed in the plan section. The patient has been provided with a written plan.    Health Maintenance   Topic Date Due   • COLORECTAL CANCER SCREENING  Never done   • TDAP/TD VACCINES (1 - Tdap) Never done   • ZOSTER VACCINE (1 of 2) Never done   • HEPATITIS C SCREENING  Never done   • ANNUAL WELLNESS VISIT  Never done   • COVID-19 Vaccine (3 - Booster for Doretha series) 04/14/2022   • LIPID PANEL  01/05/2023   • INFLUENZA VACCINE  08/01/2023   • Pneumococcal Vaccine 0-64  Aged Out          The following portions of the patient's history were reviewed and updated as appropriate: allergies, current medications, past family history, past medical history, past social history, past surgical history and problem list.    Outpatient Medications Prior to Visit   Medication Sig Dispense Refill   • famotidine (PEPCID) 20 MG tablet Take 1 tablet by mouth 2 (Two) Times a Day. 60 tablet 5   • fenofibrate (TRICOR) 48 MG tablet TAKE 1 TABLET EVERY DAY 90 tablet 1   • sertraline (ZOLOFT) 50 MG tablet TAKE 1 TABLET EVERY DAY 90 tablet 1   • tadalafil (CIALIS) 10 MG tablet TAKE 1 TABLET BY MOUTH DAILY AS NEEDED FOR ERECTILE DYSFUNCTION. 6 tablet 1   • omeprazole (priLOSEC) 20 MG capsule TAKE 1 CAPSULE BY MOUTH DAILY 90 capsule 0     No facility-administered medications prior to visit.       Patient Active Problem List   Diagnosis   • Obstructive sleep apnea syndrome       Advanced Care Planning:  ACP discussion was declined by the patient. Patient does not have an advance directive, declines further assistance.    Review of Systems    Compared to one year ago, the patient feels his  "physical health is the same.  Compared to one year ago, the patient feels his mental health is the same.    Reviewed chart for potential of high risk medication in the elderly: yes  Reviewed chart for potential of harmful drug interactions in the elderly:yes    Objective         Vitals:    04/13/23 0806   BP: 117/80   Pulse: 57   Resp: 16   Temp: 97.8 °F (36.6 °C)   TempSrc: Temporal   SpO2: 98%   Weight: 98.9 kg (218 lb)   Height: 182.9 cm (72\")   PainSc: 0-No pain       Body mass index is 29.57 kg/m².  Discussed the patient's BMI with him. The BMI is above average; BMI management plan is completed.    Physical Exam    PHQ-9 Depression Screening  Little interest or pleasure in doing things? 0-->not at all   Feeling down, depressed, or hopeless? 0-->not at all   Trouble falling or staying asleep, or sleeping too much? 1-->several days   Feeling tired or having little energy? 1-->several days   Poor appetite or overeating? 0-->not at all   Feeling bad about yourself - or that you are a failure or have let yourself or your family down? 0-->not at all   Trouble concentrating on things, such as reading the newspaper or watching television? 0-->not at all   Moving or speaking so slowly that other people could have noticed? Or the opposite - being so fidgety or restless that you have been moving around a lot more than usual? 0-->not at all   Thoughts that you would be better off dead, or of hurting yourself in some way? 0-->not at all   PHQ-9 Total Score 2   If you checked off any problems, how difficult have these problems made it for you to do your work, take care of things at home, or get along with other people? not difficult at all      4/13/2023   Anxiety LYNN-7    Feeling nervous, anxious or on edge 0    Not being able to stop or control worrying 0    Worrying too much about different things 0    Trouble Relaxing 0    Being so restless that it is hard to sit still 0    Becoming easily annoyed or irritable 0  "   Feeling afraid as if something awful might happen 0    LYNN 7 Total Score 0            Assessment & Plan   Medicare Risks and Personalized Health Plan  CMS Preventative Services Quick Reference  Advance Directive Discussion  Alcohol Misuse  Cardiovascular risk  Chronic Pain   Colon Cancer Screening  Dementia/Memory   Depression/Dysphoria  Diabetic Lab Screening   Fall Risk  Glaucoma Risk  Hearing Problem  Immunizations Discussed/Encouraged (specific immunizations; Tdap, Shingrix and COVID19 )  Inadequate Social Support, Isolation, Loneliness, Lack of Transportation, Financial Difficulties, or Caregiver Stress   Obesity/Overweight   Prostate Cancer Screening   Tobacco Use/Dependance (use dotphrase .tobaccocessation for documentation)    The above risks/problems have been discussed with the patient.  Pertinent information has been shared with the patient in the After Visit Summary.  Follow up plans and orders are seen below in the Assessment/Plan Section.    Diagnoses and all orders for this visit:    1. Medicare annual wellness visit, subsequent (Primary)    2. Screen for colon cancer  -     Cologuard - Stool, Per Rectum; Future    3. Mixed hyperlipidemia  -     Lipid panel    4. Elevated glucose  -     Hemoglobin A1c  Discussed diabetes prevention.  5. Fatigue, unspecified type  -     Comprehensive metabolic panel  -     CBC w AUTO Differential  -     Vitamin B12  -     TSH  -     T4, free  Discussed sleep hygiene recommend getting adequate rest hydration nutrition.  6. Chews tobacco     Advised patient to avoid chewing and discussed risk factors associated with continued usage  7. Screening PSA (prostate specific antigen)  -     PSA Screen    8. Family history of protein S deficiency  -     Protein S Panel  -     Protein S Panel    9. Anxiety  Stable without worsening signs and symptoms  10. Sleep apnea with use of continuous positive airway pressure (CPAP)  Stable without worsening signs and symptoms  11. Chronic  pain of right knee  Discussed nonpharmacological interventions along with worsening signs and symptoms.  May need further imaging if symptoms persist  12. Gastroesophageal reflux disease, unspecified whether esophagitis present  Avoid eating spicy foods  Avoid caffeinated beverages  Avoid carbonated beverages  Do not eat or drink within 2-3 hours of going to bed in the evening  Elevate head of bed on 4-6 inch blocks  Eat smaller portions of food throughout the day    13. Overweight (BMI 25.0-29.9)    Patient's (Body mass index is 29.57 kg/m².) indicates that they are overweight (BMI 25-29.9) with health related conditions that include obstructive sleep apnea, dyslipidemias and GERD . Weight is improving with lifestyle modifications. BMI is is above average; BMI management plan is completed. We discussed low calorie, low carb based diet program, portion control, increasing exercise, joining a fitness center or start home based exercise program, Weight Watchers or other Commercial based weight reduction program, management of depression/anxiety/stress to control compensatory eating and decreasing alcohol consumption.     Follow Up:  Return if symptoms worsen or fail to improve.     An After Visit Summary and PPPS were given to the patient.

## 2023-04-15 LAB
ALBUMIN SERPL-MCNC: 4.8 G/DL (ref 3.8–4.9)
ALBUMIN/GLOB SERPL: 1.8 {RATIO} (ref 1.2–2.2)
ALP SERPL-CCNC: 57 IU/L (ref 44–121)
ALT SERPL-CCNC: 28 IU/L (ref 0–44)
AST SERPL-CCNC: 22 IU/L (ref 0–40)
BASOPHILS # BLD AUTO: 0 X10E3/UL (ref 0–0.2)
BASOPHILS NFR BLD AUTO: 1 %
BILIRUB SERPL-MCNC: 0.4 MG/DL (ref 0–1.2)
BUN SERPL-MCNC: 18 MG/DL (ref 6–24)
BUN/CREAT SERPL: 17 (ref 9–20)
CALCIUM SERPL-MCNC: 9.9 MG/DL (ref 8.7–10.2)
CHLORIDE SERPL-SCNC: 101 MMOL/L (ref 96–106)
CHOLEST SERPL-MCNC: 202 MG/DL (ref 100–199)
CO2 SERPL-SCNC: 23 MMOL/L (ref 20–29)
CREAT SERPL-MCNC: 1.07 MG/DL (ref 0.76–1.27)
EGFRCR SERPLBLD CKD-EPI 2021: 80 ML/MIN/1.73
EOSINOPHIL # BLD AUTO: 0.2 X10E3/UL (ref 0–0.4)
EOSINOPHIL NFR BLD AUTO: 4 %
ERYTHROCYTE [DISTWIDTH] IN BLOOD BY AUTOMATED COUNT: 12.9 % (ref 11.6–15.4)
GLOBULIN SER CALC-MCNC: 2.6 G/DL (ref 1.5–4.5)
GLUCOSE SERPL-MCNC: 100 MG/DL (ref 70–99)
HBA1C MFR BLD: 5.6 % (ref 4.8–5.6)
HCT VFR BLD AUTO: 47 % (ref 37.5–51)
HDLC SERPL-MCNC: 44 MG/DL
HGB BLD-MCNC: 16 G/DL (ref 13–17.7)
IMM GRANULOCYTES # BLD AUTO: 0 X10E3/UL (ref 0–0.1)
IMM GRANULOCYTES NFR BLD AUTO: 0 %
LDLC SERPL CALC-MCNC: 130 MG/DL (ref 0–99)
LYMPHOCYTES # BLD AUTO: 0.8 X10E3/UL (ref 0.7–3.1)
LYMPHOCYTES NFR BLD AUTO: 18 %
MCH RBC QN AUTO: 29.3 PG (ref 26.6–33)
MCHC RBC AUTO-ENTMCNC: 34 G/DL (ref 31.5–35.7)
MCV RBC AUTO: 86 FL (ref 79–97)
MONOCYTES # BLD AUTO: 0.6 X10E3/UL (ref 0.1–0.9)
MONOCYTES NFR BLD AUTO: 14 %
NEUTROPHILS # BLD AUTO: 2.9 X10E3/UL (ref 1.4–7)
NEUTROPHILS NFR BLD AUTO: 63 %
PLATELET # BLD AUTO: 233 X10E3/UL (ref 150–450)
POTASSIUM SERPL-SCNC: 4.6 MMOL/L (ref 3.5–5.2)
PROT S ACT/NOR PPP: 113 % (ref 63–140)
PROT S AG ACT/NOR PPP IA: 126 % (ref 60–150)
PROT S FREE AG ACT/NOR PPP IA: 118 % (ref 61–136)
PROT SERPL-MCNC: 7.4 G/DL (ref 6–8.5)
PSA SERPL-MCNC: 0.9 NG/ML (ref 0–4)
RBC # BLD AUTO: 5.47 X10E6/UL (ref 4.14–5.8)
SODIUM SERPL-SCNC: 140 MMOL/L (ref 134–144)
T4 FREE SERPL-MCNC: 1.21 NG/DL (ref 0.82–1.77)
TRIGL SERPL-MCNC: 156 MG/DL (ref 0–149)
TSH SERPL DL<=0.005 MIU/L-ACNC: 2.77 UIU/ML (ref 0.45–4.5)
VIT B12 SERPL-MCNC: 391 PG/ML (ref 232–1245)
VLDLC SERPL CALC-MCNC: 28 MG/DL (ref 5–40)
WBC # BLD AUTO: 4.6 X10E3/UL (ref 3.4–10.8)

## 2023-07-28 DIAGNOSIS — K21.9 GASTROESOPHAGEAL REFLUX DISEASE, UNSPECIFIED WHETHER ESOPHAGITIS PRESENT: ICD-10-CM

## 2023-07-28 RX ORDER — FAMOTIDINE 20 MG/1
20 TABLET, FILM COATED ORAL 2 TIMES DAILY
Qty: 180 TABLET | Refills: 1 | Status: SHIPPED | OUTPATIENT
Start: 2023-07-28

## 2023-08-01 ENCOUNTER — OFFICE VISIT (OUTPATIENT)
Dept: NEUROLOGY | Facility: CLINIC | Age: 58
End: 2023-08-01
Payer: MEDICARE

## 2023-08-01 VITALS
OXYGEN SATURATION: 96 % | WEIGHT: 230 LBS | TEMPERATURE: 97.5 F | SYSTOLIC BLOOD PRESSURE: 130 MMHG | BODY MASS INDEX: 31.15 KG/M2 | DIASTOLIC BLOOD PRESSURE: 84 MMHG | HEIGHT: 72 IN | HEART RATE: 86 BPM

## 2023-08-01 DIAGNOSIS — G47.33 OBSTRUCTIVE SLEEP APNEA: Primary | ICD-10-CM

## 2023-08-01 PROCEDURE — 1159F MED LIST DOCD IN RCRD: CPT | Performed by: NURSE PRACTITIONER

## 2023-08-01 PROCEDURE — 99213 OFFICE O/P EST LOW 20 MIN: CPT | Performed by: NURSE PRACTITIONER

## 2023-08-01 PROCEDURE — 1160F RVW MEDS BY RX/DR IN RCRD: CPT | Performed by: NURSE PRACTITIONER

## 2023-08-01 RX ORDER — EPINEPHRINE 0.3 MG/.3ML
INJECTION SUBCUTANEOUS
COMMUNITY
Start: 2023-07-07

## 2023-09-14 DIAGNOSIS — F41.9 ANXIETY: ICD-10-CM

## 2023-09-15 ENCOUNTER — TELEPHONE (OUTPATIENT)
Dept: NEUROLOGY | Facility: CLINIC | Age: 58
End: 2023-09-15
Payer: MEDICARE

## 2023-09-15 NOTE — TELEPHONE ENCOUNTER
Caller: Diane Arango    Relationship: Emergency Contact    Best call back number: [phone]    What is the best time to reach you: ANYTIME, PLS LVM WITH THE # IF NO ANSWER    Who are you requesting to speak with (clinical staff, provider,  specific staff member): SOFIA MCKEON     What was the call regarding: PATIENT'S WIFE CALLED ABOUT THE NUMBER FOR THE  DENTISTRY FOR CUSTOM FABRICATED ORAL APPLICANT TO DIMINISHED SLEEP APNEA, SOFIA MCKEON GAVE THEM AT THE PATIENT APPT. THEY LOST THAT NUMBER AND AREN'T SURE WHO TO CALL.     Is it okay if the provider responds through MyChart: NO    PLEASE REVIEW  THANK YOU

## 2024-01-09 DIAGNOSIS — E78.2 MIXED HYPERLIPIDEMIA: ICD-10-CM

## 2024-01-09 DIAGNOSIS — K21.9 GASTROESOPHAGEAL REFLUX DISEASE, UNSPECIFIED WHETHER ESOPHAGITIS PRESENT: ICD-10-CM

## 2024-01-10 RX ORDER — FENOFIBRATE 48 MG/1
48 TABLET, COATED ORAL DAILY
Qty: 30 TABLET | Refills: 0 | Status: SHIPPED | OUTPATIENT
Start: 2024-01-10

## 2024-01-10 RX ORDER — FAMOTIDINE 20 MG/1
20 TABLET, FILM COATED ORAL 2 TIMES DAILY
Qty: 60 TABLET | Refills: 0 | Status: SHIPPED | OUTPATIENT
Start: 2024-01-10

## 2024-02-16 ENCOUNTER — TELEMEDICINE (OUTPATIENT)
Dept: INTERNAL MEDICINE | Facility: CLINIC | Age: 59
End: 2024-02-16
Payer: MEDICARE

## 2024-02-16 DIAGNOSIS — M25.552 CHRONIC HIP PAIN, BILATERAL: ICD-10-CM

## 2024-02-16 DIAGNOSIS — M25.551 CHRONIC HIP PAIN, BILATERAL: ICD-10-CM

## 2024-02-16 DIAGNOSIS — G89.29 CHRONIC HIP PAIN, BILATERAL: ICD-10-CM

## 2024-02-16 DIAGNOSIS — K21.9 GASTROESOPHAGEAL REFLUX DISEASE, UNSPECIFIED WHETHER ESOPHAGITIS PRESENT: Primary | ICD-10-CM

## 2024-02-16 DIAGNOSIS — F41.9 ANXIETY: ICD-10-CM

## 2024-02-16 DIAGNOSIS — E78.2 MIXED HYPERLIPIDEMIA: ICD-10-CM

## 2024-02-16 PROCEDURE — 1160F RVW MEDS BY RX/DR IN RCRD: CPT | Performed by: NURSE PRACTITIONER

## 2024-02-16 PROCEDURE — 1159F MED LIST DOCD IN RCRD: CPT | Performed by: NURSE PRACTITIONER

## 2024-02-16 PROCEDURE — 99213 OFFICE O/P EST LOW 20 MIN: CPT | Performed by: NURSE PRACTITIONER

## 2024-02-16 RX ORDER — FAMOTIDINE 20 MG/1
20 TABLET, FILM COATED ORAL 2 TIMES DAILY
Qty: 180 TABLET | Refills: 3 | Status: SHIPPED | OUTPATIENT
Start: 2024-02-16

## 2024-02-16 RX ORDER — FENOFIBRATE 48 MG/1
48 TABLET, COATED ORAL DAILY
Qty: 90 TABLET | Refills: 3 | Status: SHIPPED | OUTPATIENT
Start: 2024-02-16

## 2024-02-16 NOTE — PROGRESS NOTES
You have chosen to receive care through a telehealth visit.  Do you consent to use a video/audio connection for your medical care today? Yes  Active Parties: Jaja MENA (work), Mayo Dunlap Ma (home) and patient (home)      Chief Complaint / Reason:      Chief Complaint   Patient presents with    Med Refill       Subjective     HPI  Patient presents today via video visit of medication refills and to discuss hip pain. 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 Denies bladder or bowel incontinence.   History taken from: patient    PMH/FH/Social History were reviewed and updated appropriately in the electronic medical record.   Past Medical History:   Diagnosis Date    Anxiety     Hyperlipidemia     LIOR (obstructive sleep apnea)      Past Surgical History:   Procedure Laterality Date    LEG SURGERY       Social History     Socioeconomic History    Marital status:    Tobacco Use    Smoking status: Former     Types: Cigarettes    Smokeless tobacco: Current   Vaping Use    Vaping status: Never Used   Substance and Sexual Activity    Alcohol use: Yes     Alcohol/week: 6.0 standard drinks of alcohol     Types: 5 Cans of beer, 1 Shots of liquor per week    Drug use: No    Sexual activity: Defer     Family History   Problem Relation Age of Onset    Hypertension Mother     Arthritis Mother     Cancer Father     Hyperlipidemia Father     Diabetes Sister     Anxiety disorder Brother     Cancer Maternal Grandfather     Diabetes Paternal Grandmother        Review of Systems:   Review of Systems      All other systems were reviewed and are negative.  Exceptions are noted in the subjective or above.      Objective     Vital Signs  There were no vitals filed for this visit.    There is no height or weight on file to calculate BMI.         Physical Exam  Nursing note reviewed.   Constitutional:       General: He is not in acute distress.     Appearance: He is  well-developed. He is not diaphoretic.   HENT:      Head: Normocephalic.   Pulmonary:      Effort: Pulmonary effort is normal. No respiratory distress.   Chest:      Chest wall: No tenderness.   Musculoskeletal:         General: Tenderness (hip pain) and deformity present.   Skin:     General: Skin is warm and dry.      Capillary Refill: Capillary refill takes less than 2 seconds.   Neurological:      Mental Status: He is alert and oriented to person, place, and time.   Psychiatric:         Behavior: Behavior normal.         Thought Content: Thought content normal.         Judgment: Judgment normal.              Results Review:    I reviewed the patient's new clinical results.       Medication Review:     Current Outpatient Medications:     EPINEPHrine (EPIPEN) 0.3 MG/0.3ML solution auto-injector injection, INJECT 0.3 ML UNDER THE SKIN INTO THE APPROPRIATE AREA AS DIRECTED 1 (ONE) TIME FOR 1 DOSE., Disp: , Rfl:     famotidine (PEPCID) 20 MG tablet, Take 1 tablet by mouth 2 (Two) Times a Day., Disp: 60 tablet, Rfl: 0    fenofibrate (TRICOR) 48 MG tablet, Take 1 tablet by mouth Daily., Disp: 30 tablet, Rfl: 0    sertraline (ZOLOFT) 50 MG tablet, TAKE 1 TABLET EVERY DAY, Disp: 90 tablet, Rfl: 1    tadalafil (CIALIS) 10 MG tablet, TAKE 1 TABLET BY MOUTH DAILY AS NEEDED FOR ERECTILE DYSFUNCTION., Disp: 6 tablet, Rfl: 1    Diagnoses and all orders for this visit:    Gastroesophageal reflux disease, unspecified whether esophagitis present  -     famotidine (PEPCID) 20 MG tablet; Take 1 tablet by mouth 2 (Two) Times a Day.    Mixed hyperlipidemia  -     fenofibrate (TRICOR) 48 MG tablet; Take 1 tablet by mouth Daily.    Anxiety  -     sertraline (ZOLOFT) 50 MG tablet; Take 1 tablet by mouth Daily.    Chronic hip pain, bilateral    Discussed worsening s/s and interventions recommend specialist if symptoms persist.       Return in about 2 months (around 4/16/2024), or if symptoms worsen or fail to improve, for Medicare  Wellness.    Jaja Denson, APRN  02/16/2024

## 2024-07-01 ENCOUNTER — APPOINTMENT (OUTPATIENT)
Dept: CT IMAGING | Facility: HOSPITAL | Age: 59
End: 2024-07-01
Payer: MEDICARE

## 2024-07-01 ENCOUNTER — HOSPITAL ENCOUNTER (EMERGENCY)
Facility: HOSPITAL | Age: 59
Discharge: HOME | End: 2024-07-01
Attending: STUDENT IN AN ORGANIZED HEALTH CARE EDUCATION/TRAINING PROGRAM
Payer: MEDICARE

## 2024-07-01 VITALS
HEIGHT: 72 IN | SYSTOLIC BLOOD PRESSURE: 125 MMHG | RESPIRATION RATE: 16 BRPM | OXYGEN SATURATION: 95 % | WEIGHT: 229.6 LBS | TEMPERATURE: 98.1 F | DIASTOLIC BLOOD PRESSURE: 83 MMHG | BODY MASS INDEX: 31.1 KG/M2 | HEART RATE: 70 BPM

## 2024-07-01 DIAGNOSIS — R11.2 NAUSEA AND VOMITING, UNSPECIFIED VOMITING TYPE: ICD-10-CM

## 2024-07-01 DIAGNOSIS — R10.84 GENERALIZED ABDOMINAL PAIN: Primary | ICD-10-CM

## 2024-07-01 LAB
ALBUMIN SERPL-MCNC: 4.8 G/DL (ref 3.5–5.2)
ALBUMIN/GLOB SERPL: 1.3 G/DL
ALP SERPL-CCNC: 66 U/L (ref 39–117)
ALT SERPL W P-5'-P-CCNC: 96 U/L (ref 1–41)
ANION GAP SERPL CALCULATED.3IONS-SCNC: 15 MMOL/L (ref 5–15)
AST SERPL-CCNC: 52 U/L (ref 1–40)
BASOPHILS # BLD AUTO: 0.07 10*3/MM3 (ref 0–0.2)
BASOPHILS NFR BLD AUTO: 0.6 % (ref 0–1.5)
BILIRUB SERPL-MCNC: 0.7 MG/DL (ref 0–1.2)
BILIRUB UR QL STRIP: NEGATIVE
BUN SERPL-MCNC: 12 MG/DL (ref 8–23)
BUN/CREAT SERPL: 10.5 (ref 7–25)
CALCIUM SPEC-SCNC: 10.4 MG/DL (ref 8.6–10.5)
CHLORIDE SERPL-SCNC: 98 MMOL/L (ref 98–107)
CLARITY UR: CLEAR
CO2 SERPL-SCNC: 24 MMOL/L (ref 22–29)
COLOR UR: YELLOW
CREAT SERPL-MCNC: 1.14 MG/DL (ref 0.76–1.27)
CRP SERPL-MCNC: 3.28 MG/DL (ref 0–0.5)
D-LACTATE SERPL-SCNC: 1.8 MMOL/L (ref 0.5–2)
DEPRECATED RDW RBC AUTO: 41.9 FL (ref 37–54)
EGFRCR SERPLBLD CKD-EPI 2021: 73.6 ML/MIN/1.73
EOSINOPHIL # BLD AUTO: 0.22 10*3/MM3 (ref 0–0.4)
EOSINOPHIL NFR BLD AUTO: 2 % (ref 0.3–6.2)
ERYTHROCYTE [DISTWIDTH] IN BLOOD BY AUTOMATED COUNT: 13.2 % (ref 12.3–15.4)
FLUAV SUBTYP SPEC NAA+PROBE: NOT DETECTED
FLUBV RNA ISLT QL NAA+PROBE: NOT DETECTED
GLOBULIN UR ELPH-MCNC: 3.6 GM/DL
GLUCOSE SERPL-MCNC: 137 MG/DL (ref 65–99)
GLUCOSE UR STRIP-MCNC: NEGATIVE MG/DL
HCT VFR BLD AUTO: 50 % (ref 37.5–51)
HGB BLD-MCNC: 17.4 G/DL (ref 13–17.7)
HGB UR QL STRIP.AUTO: NEGATIVE
HOLD SPECIMEN: NORMAL
HOLD SPECIMEN: NORMAL
IMM GRANULOCYTES # BLD AUTO: 0.05 10*3/MM3 (ref 0–0.05)
IMM GRANULOCYTES NFR BLD AUTO: 0.5 % (ref 0–0.5)
KETONES UR QL STRIP: NEGATIVE
LEUKOCYTE ESTERASE UR QL STRIP.AUTO: NEGATIVE
LIPASE SERPL-CCNC: 142 U/L (ref 13–60)
LYMPHOCYTES # BLD AUTO: 0.88 10*3/MM3 (ref 0.7–3.1)
LYMPHOCYTES NFR BLD AUTO: 8 % (ref 19.6–45.3)
MCH RBC QN AUTO: 30.2 PG (ref 26.6–33)
MCHC RBC AUTO-ENTMCNC: 34.8 G/DL (ref 31.5–35.7)
MCV RBC AUTO: 86.7 FL (ref 79–97)
MONOCYTES # BLD AUTO: 0.85 10*3/MM3 (ref 0.1–0.9)
MONOCYTES NFR BLD AUTO: 7.7 % (ref 5–12)
NEUTROPHILS NFR BLD AUTO: 8.94 10*3/MM3 (ref 1.7–7)
NEUTROPHILS NFR BLD AUTO: 81.2 % (ref 42.7–76)
NITRITE UR QL STRIP: NEGATIVE
NRBC BLD AUTO-RTO: 0 /100 WBC (ref 0–0.2)
PH UR STRIP.AUTO: 6 [PH] (ref 5–8)
PLATELET # BLD AUTO: 241 10*3/MM3 (ref 140–450)
PMV BLD AUTO: 10.5 FL (ref 6–12)
POTASSIUM SERPL-SCNC: 4.3 MMOL/L (ref 3.5–5.2)
PROT SERPL-MCNC: 8.4 G/DL (ref 6–8.5)
PROT UR QL STRIP: NEGATIVE
RBC # BLD AUTO: 5.77 10*6/MM3 (ref 4.14–5.8)
SARS-COV-2 RNA RESP QL NAA+PROBE: NOT DETECTED
SODIUM SERPL-SCNC: 137 MMOL/L (ref 136–145)
SP GR UR STRIP: 1.01 (ref 1–1.03)
TROPONIN T SERPL HS-MCNC: 8 NG/L
UROBILINOGEN UR QL STRIP: NORMAL
WBC NRBC COR # BLD AUTO: 11.01 10*3/MM3 (ref 3.4–10.8)
WHOLE BLOOD HOLD COAG: NORMAL
WHOLE BLOOD HOLD SPECIMEN: NORMAL

## 2024-07-01 PROCEDURE — 84484 ASSAY OF TROPONIN QUANT: CPT | Performed by: STUDENT IN AN ORGANIZED HEALTH CARE EDUCATION/TRAINING PROGRAM

## 2024-07-01 PROCEDURE — 74177 CT ABD & PELVIS W/CONTRAST: CPT

## 2024-07-01 PROCEDURE — 93005 ELECTROCARDIOGRAM TRACING: CPT | Performed by: STUDENT IN AN ORGANIZED HEALTH CARE EDUCATION/TRAINING PROGRAM

## 2024-07-01 PROCEDURE — 25510000001 IOPAMIDOL 61 % SOLUTION: Performed by: STUDENT IN AN ORGANIZED HEALTH CARE EDUCATION/TRAINING PROGRAM

## 2024-07-01 PROCEDURE — 83605 ASSAY OF LACTIC ACID: CPT | Performed by: STUDENT IN AN ORGANIZED HEALTH CARE EDUCATION/TRAINING PROGRAM

## 2024-07-01 PROCEDURE — 85025 COMPLETE CBC W/AUTO DIFF WBC: CPT | Performed by: STUDENT IN AN ORGANIZED HEALTH CARE EDUCATION/TRAINING PROGRAM

## 2024-07-01 PROCEDURE — 86140 C-REACTIVE PROTEIN: CPT | Performed by: STUDENT IN AN ORGANIZED HEALTH CARE EDUCATION/TRAINING PROGRAM

## 2024-07-01 PROCEDURE — 99285 EMERGENCY DEPT VISIT HI MDM: CPT

## 2024-07-01 PROCEDURE — 87636 SARSCOV2 & INF A&B AMP PRB: CPT | Performed by: STUDENT IN AN ORGANIZED HEALTH CARE EDUCATION/TRAINING PROGRAM

## 2024-07-01 PROCEDURE — 80053 COMPREHEN METABOLIC PANEL: CPT | Performed by: STUDENT IN AN ORGANIZED HEALTH CARE EDUCATION/TRAINING PROGRAM

## 2024-07-01 PROCEDURE — 81003 URINALYSIS AUTO W/O SCOPE: CPT | Performed by: STUDENT IN AN ORGANIZED HEALTH CARE EDUCATION/TRAINING PROGRAM

## 2024-07-01 PROCEDURE — 83690 ASSAY OF LIPASE: CPT | Performed by: STUDENT IN AN ORGANIZED HEALTH CARE EDUCATION/TRAINING PROGRAM

## 2024-07-01 PROCEDURE — 25810000003 LACTATED RINGERS SOLUTION: Performed by: STUDENT IN AN ORGANIZED HEALTH CARE EDUCATION/TRAINING PROGRAM

## 2024-07-01 RX ORDER — ONDANSETRON 4 MG/1
4 TABLET, ORALLY DISINTEGRATING ORAL EVERY 8 HOURS PRN
Qty: 20 TABLET | Refills: 0 | Status: SHIPPED | OUTPATIENT
Start: 2024-07-01

## 2024-07-01 RX ORDER — ASPIRIN 81 MG/1
81 TABLET ORAL DAILY
COMMUNITY

## 2024-07-01 RX ORDER — SODIUM CHLORIDE 0.9 % (FLUSH) 0.9 %
10 SYRINGE (ML) INJECTION AS NEEDED
Status: DISCONTINUED | OUTPATIENT
Start: 2024-07-01 | End: 2024-07-01 | Stop reason: HOSPADM

## 2024-07-01 RX ADMIN — SODIUM CHLORIDE, POTASSIUM CHLORIDE, SODIUM LACTATE AND CALCIUM CHLORIDE 1000 ML: 600; 310; 30; 20 INJECTION, SOLUTION INTRAVENOUS at 06:41

## 2024-07-01 RX ADMIN — IOPAMIDOL 100 ML: 612 INJECTION, SOLUTION INTRAVENOUS at 06:33

## 2024-07-01 NOTE — ED PROVIDER NOTES
Subjective:  History of Present Illness:    Patient is a 58-year-old male history of anxiety, hyperlipidemia, LIOR who presents today with epigastric, right lower quadrant pain.  States onset of pain yesterday and through the evening.  States that he is have bouts of diarrhea with severe pain.  Denies any blood in his stool.  Denies any dysuria or any history of kidney stones.  Denies any fevers at home.  No vomiting or nausea.  Denies any chest pain or shortness of breath.  No leg swelling or pain.      Nurses Notes reviewed and agree, including vitals, allergies, social history and prior medical history.     REVIEW OF SYSTEMS: All systems reviewed and not pertinent unless noted.  Review of Systems   Constitutional:  Positive for activity change, appetite change and chills. Negative for fatigue and fever.   HENT:  Negative for congestion, sinus pressure, sneezing and trouble swallowing.    Eyes:  Negative for discharge and itching.   Respiratory:  Negative for cough and shortness of breath.    Cardiovascular:  Negative for chest pain and palpitations.   Gastrointestinal:  Positive for abdominal pain and diarrhea. Negative for abdominal distention, nausea and vomiting.   Endocrine: Negative for cold intolerance and heat intolerance.   Genitourinary:  Negative for decreased urine volume, dysuria and urgency.   Musculoskeletal:  Negative for gait problem, neck pain and neck stiffness.   Skin:  Negative for color change and rash.   Allergic/Immunologic: Negative for immunocompromised state.   Neurological:  Negative for facial asymmetry and headaches.   Hematological:  Negative for adenopathy.   Psychiatric/Behavioral:  Negative for self-injury and suicidal ideas.        Past Medical History:   Diagnosis Date    Anxiety     Hyperlipidemia     LIOR (obstructive sleep apnea)        Allergies:    Patient has no known allergies.      Past Surgical History:   Procedure Laterality Date    LEG SURGERY           Social History  "    Socioeconomic History    Marital status:    Tobacco Use    Smoking status: Former     Types: Cigarettes    Smokeless tobacco: Current   Vaping Use    Vaping status: Never Used   Substance and Sexual Activity    Alcohol use: Yes     Alcohol/week: 6.0 standard drinks of alcohol     Types: 5 Cans of beer, 1 Shots of liquor per week    Drug use: No    Sexual activity: Defer         Family History   Problem Relation Age of Onset    Hypertension Mother     Arthritis Mother     Cancer Father     Hyperlipidemia Father     Diabetes Sister     Anxiety disorder Brother     Cancer Maternal Grandfather     Diabetes Paternal Grandmother        Objective  Physical Exam:  /83   Pulse 70   Temp 98.1 °F (36.7 °C) (Oral)   Resp 16   Ht 182.9 cm (72\")   Wt 104 kg (229 lb 9.6 oz)   SpO2 95%   BMI 31.14 kg/m²      Physical Exam  Constitutional:       General: He is not in acute distress.     Appearance: Normal appearance. He is normal weight. He is obese. He is not ill-appearing.   HENT:      Head: Normocephalic and atraumatic.      Nose: Nose normal. No congestion or rhinorrhea.      Mouth/Throat:      Mouth: Mucous membranes are moist.      Pharynx: Oropharynx is clear.   Eyes:      Extraocular Movements: Extraocular movements intact.      Conjunctiva/sclera: Conjunctivae normal.      Pupils: Pupils are equal, round, and reactive to light.   Cardiovascular:      Rate and Rhythm: Normal rate and regular rhythm.      Pulses: Normal pulses.   Pulmonary:      Effort: Pulmonary effort is normal. No respiratory distress.      Breath sounds: Normal breath sounds.   Abdominal:      General: Abdomen is flat. Bowel sounds are normal. There is no distension.      Palpations: Abdomen is soft.      Tenderness: There is no abdominal tenderness. There is no right CVA tenderness, left CVA tenderness, guarding or rebound.      Comments: Unreactive to abdominal palpation at the time my exam   Musculoskeletal:         General: No " swelling or tenderness. Normal range of motion.      Cervical back: Normal range of motion and neck supple. No rigidity or tenderness.   Skin:     General: Skin is warm and dry.      Capillary Refill: Capillary refill takes less than 2 seconds.   Neurological:      General: No focal deficit present.      Mental Status: He is alert and oriented to person, place, and time. Mental status is at baseline.      Cranial Nerves: No cranial nerve deficit.      Sensory: No sensory deficit.      Motor: No weakness.   Psychiatric:         Mood and Affect: Mood normal.         Behavior: Behavior normal.         Thought Content: Thought content normal.         Judgment: Judgment normal.         Procedures    ED Course:    ED Course as of 07/01/24 0718 Mon Jul 01, 2024   0620 EKG interpreted by me, normal sinus rhythm with frequent PVCs, no concerning ST changes noted, rate of 79 [JE]      ED Course User Index  [JE] Alirio Mccoy MD       Lab Results (last 24 hours)       Procedure Component Value Units Date/Time    Urinalysis With Microscopic If Indicated (No Culture) - Urine, Clean Catch [[id number]]  (Normal) Collected: 07/01/24 0547    Specimen: Urine, Clean Catch Updated: 07/01/24 0603     Color, UA Yellow     Appearance, UA Clear     pH, UA 6.0     Specific Gravity, UA 1.015     Glucose, UA Negative     Ketones, UA Negative     Bilirubin, UA Negative     Blood, UA Negative     Protein, UA Negative     Leuk Esterase, UA Negative     Nitrite, UA Negative     Urobilinogen, UA 0.2 E.U./dL    Narrative:      Urine microscopic not indicated.    CBC & Differential [[id number]]  (Abnormal) Collected: 07/01/24 0554    Specimen: Blood Updated: 07/01/24 0601    Narrative:      The following orders were created for panel order CBC & Differential.  Procedure                               Abnormality         Status                     ---------                               -----------         ------                     CBC Auto  Differential[[id number]]        Abnormal            Final result                 Please view results for these tests on the individual orders.    Comprehensive Metabolic Panel [[id number]]  (Abnormal) Collected: 07/01/24 0554    Specimen: Blood Updated: 07/01/24 0624     Glucose 137 mg/dL      BUN 12 mg/dL      Creatinine 1.14 mg/dL      Sodium 137 mmol/L      Potassium 4.3 mmol/L      Chloride 98 mmol/L      CO2 24.0 mmol/L      Calcium 10.4 mg/dL      Total Protein 8.4 g/dL      Albumin 4.8 g/dL      ALT (SGPT) 96 U/L      AST (SGOT) 52 U/L      Alkaline Phosphatase 66 U/L      Total Bilirubin 0.7 mg/dL      Globulin 3.6 gm/dL      A/G Ratio 1.3 g/dL      BUN/Creatinine Ratio 10.5     Anion Gap 15.0 mmol/L      eGFR 73.6 mL/min/1.73     Narrative:      GFR Normal >60  Chronic Kidney Disease <60  Kidney Failure <15      Lipase [[id number]]  (Abnormal) Collected: 07/01/24 0554    Specimen: Blood Updated: 07/01/24 0624     Lipase 142 U/L     Lactic Acid, Plasma [[id number]]  (Normal) Collected: 07/01/24 0554    Specimen: Blood Updated: 07/01/24 0620     Lactate 1.8 mmol/L     CBC Auto Differential [[id number]]  (Abnormal) Collected: 07/01/24 0554    Specimen: Blood Updated: 07/01/24 0601     WBC 11.01 10*3/mm3      RBC 5.77 10*6/mm3      Hemoglobin 17.4 g/dL      Hematocrit 50.0 %      MCV 86.7 fL      MCH 30.2 pg      MCHC 34.8 g/dL      RDW 13.2 %      RDW-SD 41.9 fl      MPV 10.5 fL      Platelets 241 10*3/mm3      Neutrophil % 81.2 %      Lymphocyte % 8.0 %      Monocyte % 7.7 %      Eosinophil % 2.0 %      Basophil % 0.6 %      Immature Grans % 0.5 %      Neutrophils, Absolute 8.94 10*3/mm3      Lymphocytes, Absolute 0.88 10*3/mm3      Monocytes, Absolute 0.85 10*3/mm3      Eosinophils, Absolute 0.22 10*3/mm3      Basophils, Absolute 0.07 10*3/mm3      Immature Grans, Absolute 0.05 10*3/mm3      nRBC 0.0 /100 WBC     High Sensitivity Troponin T [[id number]]  (Normal) Collected: 07/01/24 0599    Specimen: Blood  Updated: 07/01/24 0635     HS Troponin T 8 ng/L     Narrative:      High Sensitive Troponin T Reference Range:  <14.0 ng/L- Negative Female for AMI  <22.0 ng/L- Negative Male for AMI  >=14 - Abnormal Female indicating possible myocardial injury.  >=22 - Abnormal Male indicating possible myocardial injury.   Clinicians would have to utilize clinical acumen, EKG, Troponin, and serial changes to determine if it is an Acute Myocardial Infarction or myocardial injury due to an underlying chronic condition.         C-reactive Protein [[id number]]  (Abnormal) Collected: 07/01/24 0554    Specimen: Blood Updated: 07/01/24 0638     C-Reactive Protein 3.28 mg/dL     COVID-19 and FLU A/B PCR, 1 HR TAT - Swab, Nasopharynx [[id number]]  (Normal) Collected: 07/01/24 0621    Specimen: Swab from Nasopharynx Updated: 07/01/24 0645     COVID19 Not Detected     Influenza A PCR Not Detected     Influenza B PCR Not Detected    Narrative:      Fact sheet for providers: https://www.fda.gov/media/796684/download    Fact sheet for patients: https://www.fda.gov/media/595717/download    Test performed by PCR.             CT Abdomen Pelvis With Contrast    Result Date: 7/1/2024  FINAL REPORT TECHNIQUE: null CLINICAL HISTORY: Epigastric, right lower quadrant pain, eval appendicitis COMPARISON: null FINDINGS: CT abdomen and pelvis with contrast Comparison: None Findings: Subsegmental atelectasis of the lung bases. A calcified granuloma of the right lung base. Mild atherosclerosis of the abdominal aorta extending into the iliac vasculature. Hepatomegaly and hepatic steatosis. The gallbladder, pancreas, spleen, adrenal glands are all unremarkable. Nonspecific bilateral perinephric stranding. No nephrolithiasis or hydronephrosis. The contour of the urinary bladder is unremarkable. Pelvic contents unremarkable. Normal prostate size. No bowel obstruction, pneumoperitoneum, or pneumatosis. Normal appendix. The bones are intact. Degenerative changes of  the lumbar spine.     Impression: IMPRESSION: 1. Normal appendix. 2. Hepatomegaly and hepatic steatosis. Authenticated and Electronically Signed by Malvin Hastings DO on 07/01/2024 07:08:24 AM        MDM      Initial impression of presenting illness: Abdominal pain    DDX: includes but is not limited to: Appendicitis, gastroenteritis, cholecystitis    Patient arrives stable with vitals interpreted by myself.     Pertinent features from physical exam: Clear to oscitation, regular rhythm, no murmur, nontender to Dalm to palpation at the time my evaluation.    Initial diagnostic plan: CBC, CMP, lipase, UA, CRP, lactic acid, troponin, EKG, CT abdomen pelvis    Results from initial plan were reviewed and interpreted by me revealing no concern for acute intra-abdominal process, no concern for appendicitis or cholecystitis.  Patient with signs of fatty liver disease and elevated LFTs which I discussed with family at bedside.  Appears consistent with viral illness at this time given lack of findings on CT imaging    Diagnostic information from other sources: Discussed wife at bedside reviewed past medical records    Interventions / Re-evaluation: Given IV fluids for symptom control    Results/clinical rationale were discussed with patient and wife at bedside    Consultations/Discussion of results with other physicians: Discussed negative workup in emergency department, no concern for cholecystitis or appendicitis.  Given pain suspect viral illness is etiology.  Have prescribed Zofran in the event that nausea and vomiting does occur and encourage oral hydration at home.  Given strict turn precaution for any severe increase in abdominal pain.  Otherwise, encourage patient to follow with his primary care doctor for lab recheck of LFTs and to discuss fatty liver disease moving forward.    Disposition plan: Discharge  -----        Final diagnoses:   Generalized abdominal pain   Nausea and vomiting, unspecified vomiting type           Darius, Alirio Murdock MD  07/01/24 0719

## 2024-07-01 NOTE — Clinical Note
[facility] EMERGENCY DEPARTMENT  [address] [address]  Phone: [phone]    Kulwant Arango was seen and treated in our emergency department on 7/1/2024.  He may return to work on 07/04/2024.         Thank you for choosing [facility].    Alirio Mccoy MD

## 2024-07-01 NOTE — DISCHARGE INSTRUCTIONS
You were evaluated for nausea vomiting abdominal pain.  We got labs and a CT scan which all showed no concerns for any acute process.  As we discussed, your lab work and imaging is likely consistent with a viral illness.  You are now stable for discharge.  We have sent a course of Zofran to help with nausea at home if this is to develop and would recommend drinking plenty of fluids to prevent dehydration.  Recommend following with primary care doctor to ensure that you improve appropriately.  You are now stable for discharge.

## 2025-02-14 ENCOUNTER — TELEPHONE (OUTPATIENT)
Dept: NEUROLOGY | Facility: CLINIC | Age: 60
End: 2025-02-14
Payer: MEDICARE

## 2025-02-14 NOTE — TELEPHONE ENCOUNTER
Patient's wife called wanting to know if you are able to pull his compliance or if she needs to bring the card to his appointment. She asked for you to call and let them know

## 2025-02-19 NOTE — TELEPHONE ENCOUNTER
SENT PATIENT MY CHART MESSAGE TO LET THEM KNOW I AM ABLE TO PULL THE REPORT THAT HE WILL NOT NEED TO BRING HIS CARD.

## 2025-02-25 ENCOUNTER — OFFICE VISIT (OUTPATIENT)
Dept: NEUROLOGY | Facility: CLINIC | Age: 60
End: 2025-02-25
Payer: MEDICARE

## 2025-02-25 VITALS
HEART RATE: 73 BPM | BODY MASS INDEX: 29.93 KG/M2 | OXYGEN SATURATION: 99 % | SYSTOLIC BLOOD PRESSURE: 127 MMHG | DIASTOLIC BLOOD PRESSURE: 82 MMHG | WEIGHT: 221 LBS | TEMPERATURE: 97.1 F | HEIGHT: 72 IN

## 2025-02-25 DIAGNOSIS — G47.33 OBSTRUCTIVE SLEEP APNEA: Primary | ICD-10-CM

## 2025-02-25 PROCEDURE — 1159F MED LIST DOCD IN RCRD: CPT | Performed by: NURSE PRACTITIONER

## 2025-02-25 PROCEDURE — 1160F RVW MEDS BY RX/DR IN RCRD: CPT | Performed by: NURSE PRACTITIONER

## 2025-02-25 PROCEDURE — 99213 OFFICE O/P EST LOW 20 MIN: CPT | Performed by: NURSE PRACTITIONER

## 2025-02-25 NOTE — PROGRESS NOTES
Follow Up Office Visit      Patient Name: Kulwant Arango  : 1965   MRN: [id number]     Chief Complaint:    Chief Complaint   Patient presents with    Follow-up     Patient in office to follow up on kiran.          History of Present Illness: Kulwant Arango is a 59 y.o. male who is here today to follow up with KIRAN and was last seen on 2023.  Currently on Auto CPAP 8-16cm, peak average pressure 12.9cm, compliance > 4 hours 97.8%, overall compliance 100%, AHI 4/hour.  He is tolerating his pressures well.  He was diagnosed with severe KIRAN (AHI 44/hour) in - so his AHI has improved significantly!  He does have some daytime tiredness but doesn't feel it is any worse than it was the last time he was here.  He does have a follow up with his PCP next month and will discuss with her- he has never had his testosterone checked.   *DME- [employer]  *Mask- nasal  *Tubing- regular   *Current compliance report reviewed and scanned into EMR.     Following taken from previous visit note:  Kulwant Arango is a 58 y.o. male who is here today to follow up with KIRAN and was last seen on 2022.  Currently on AutoPap 8-16cm, peak average pressure 9.6cm, AHI 3.1/hour, compliance 98%.  He is tolerating his pressures well.  He is using [employer] [facility] and a nasal mask.  He does have some daytime sleepiness and his wife thinks she could be disturbing his sleep because she has KIRAN and couldn't tolerate PAP therapy     Subjective      Review of Systems:   Review of Systems   Constitutional:  Positive for fatigue.       I have reviewed and the following portions of the patient's history were updated as appropriate: past family history, past medical history, past social history, past surgical history and problem list.    Medications:     Current Outpatient Medications:     aspirin 81 MG EC tablet, Take 1 tablet by mouth Daily., Disp: , Rfl:     EPINEPHrine (EPIPEN) 0.3 MG/0.3ML solution auto-injector injection, INJECT 0.3 ML UNDER THE SKIN INTO THE  "APPROPRIATE AREA AS DIRECTED 1 (ONE) TIME FOR 1 DOSE., Disp: , Rfl:     famotidine (PEPCID) 20 MG tablet, Take 1 tablet by mouth 2 (Two) Times a Day., Disp: 180 tablet, Rfl: 3    fenofibrate (TRICOR) 48 MG tablet, Take 1 tablet by mouth Daily., Disp: 90 tablet, Rfl: 3    ondansetron ODT (ZOFRAN-ODT) 4 MG disintegrating tablet, Place 1 tablet on the tongue Every 8 (Eight) Hours As Needed for Nausea or Vomiting., Disp: 20 tablet, Rfl: 0    sertraline (ZOLOFT) 50 MG tablet, Take 1 tablet by mouth Daily., Disp: 90 tablet, Rfl: 3    tadalafil (CIALIS) 10 MG tablet, TAKE 1 TABLET BY MOUTH DAILY AS NEEDED FOR ERECTILE DYSFUNCTION., Disp: 6 tablet, Rfl: 1    Allergies:   No Known Allergies    Objective     Physical Exam:  Vital Signs:   Vitals:    02/25/25 1338   BP: 127/82   BP Location: Left arm   Patient Position: Sitting   Cuff Size: Adult   Pulse: 73   Temp: 97.1 °F (36.2 °C)   SpO2: 99%   Weight: 100 kg (221 lb)   Height: 182.9 cm (72\")   PainSc: 0-No pain     Body mass index is 29.97 kg/m².    Physical Exam  Vitals and nursing note reviewed.   Constitutional:       General: He is not in acute distress.     Appearance: Normal appearance. He is well-developed. He is not diaphoretic.   HENT:      Head: Normocephalic and atraumatic.   Eyes:      Extraocular Movements: Extraocular movements intact.      Conjunctiva/sclera: Conjunctivae normal.   Pulmonary:      Effort: Pulmonary effort is normal. No respiratory distress.   Musculoskeletal:         General: Normal range of motion.   Skin:     General: Skin is dry.   Neurological:      Mental Status: He is alert and oriented to person, place, and time.   Psychiatric:         Mood and Affect: Mood normal.         Behavior: Behavior normal.         Thought Content: Thought content normal.         Judgment: Judgment normal.         Neurological Exam  Mental Status  Alert. Oriented to person, place, and time.    Cranial Nerves  CN III, IV, VI: Extraocular movements intact " bilaterally.      Assessment / Plan      Assessment/Plan:   Diagnoses and all orders for this visit:    1. Obstructive sleep apnea (Primary)       *I have discussed the importance of PAP use in regards to cardiovascular health and risk of stroke.   *Encouraged patient to discuss his tiredness/fatigue with his PCP at his follow up next month.   *Advised patient to avoid driving if drowsy.     Follow Up:   Return in about 1 year (around 2/25/2026) for F/U Obstructive Sleep Apnea.    TRINA Junior, FNP-C   Neurology and Sleep Medicine

## 2025-03-14 ENCOUNTER — OFFICE VISIT (OUTPATIENT)
Dept: INTERNAL MEDICINE | Facility: CLINIC | Age: 60
End: 2025-03-14
Payer: MEDICARE

## 2025-03-14 VITALS
OXYGEN SATURATION: 97 % | HEIGHT: 72 IN | BODY MASS INDEX: 29.12 KG/M2 | DIASTOLIC BLOOD PRESSURE: 74 MMHG | HEART RATE: 69 BPM | WEIGHT: 215 LBS | TEMPERATURE: 98 F | SYSTOLIC BLOOD PRESSURE: 116 MMHG

## 2025-03-14 DIAGNOSIS — R74.8 ELEVATED LIPASE: ICD-10-CM

## 2025-03-14 DIAGNOSIS — E78.2 MIXED HYPERLIPIDEMIA: ICD-10-CM

## 2025-03-14 DIAGNOSIS — K76.0 HEPATIC STEATOSIS: ICD-10-CM

## 2025-03-14 DIAGNOSIS — F41.9 ANXIETY: ICD-10-CM

## 2025-03-14 DIAGNOSIS — K21.9 GASTROESOPHAGEAL REFLUX DISEASE, UNSPECIFIED WHETHER ESOPHAGITIS PRESENT: ICD-10-CM

## 2025-03-14 DIAGNOSIS — Z12.5 SCREENING PSA (PROSTATE SPECIFIC ANTIGEN): ICD-10-CM

## 2025-03-14 DIAGNOSIS — Z00.00 MEDICARE ANNUAL WELLNESS VISIT, SUBSEQUENT: Primary | ICD-10-CM

## 2025-03-14 DIAGNOSIS — E55.9 VITAMIN D INSUFFICIENCY: ICD-10-CM

## 2025-03-14 DIAGNOSIS — R73.09 ELEVATED GLUCOSE: ICD-10-CM

## 2025-03-14 DIAGNOSIS — N52.9 ERECTILE DYSFUNCTION, UNSPECIFIED ERECTILE DYSFUNCTION TYPE: ICD-10-CM

## 2025-03-14 DIAGNOSIS — R53.83 FATIGUE, UNSPECIFIED TYPE: ICD-10-CM

## 2025-03-14 DIAGNOSIS — I70.0 ATHEROSCLEROSIS OF AORTA: ICD-10-CM

## 2025-03-14 RX ORDER — FENOFIBRATE 48 MG/1
48 TABLET, COATED ORAL DAILY
Qty: 90 TABLET | Refills: 3 | Status: SHIPPED | OUTPATIENT
Start: 2025-03-14

## 2025-03-14 RX ORDER — TADALAFIL 10 MG/1
10 TABLET ORAL DAILY PRN
Qty: 6 TABLET | Refills: 1 | Status: SHIPPED | OUTPATIENT
Start: 2025-03-14

## 2025-03-14 RX ORDER — MUPIROCIN 20 MG/G
1 OINTMENT TOPICAL 3 TIMES DAILY
Qty: 1 EACH | Refills: 0 | Status: SHIPPED | OUTPATIENT
Start: 2025-03-14

## 2025-03-14 RX ORDER — OMEPRAZOLE 20 MG/1
20 CAPSULE, DELAYED RELEASE ORAL DAILY
Qty: 90 CAPSULE | Refills: 3 | Status: SHIPPED | OUTPATIENT
Start: 2025-03-14

## 2025-03-14 RX ORDER — OMEPRAZOLE 20 MG/1
20 CAPSULE, DELAYED RELEASE ORAL DAILY
COMMUNITY
End: 2025-03-14 | Stop reason: SDUPTHER

## 2025-03-14 RX ORDER — FLUTICASONE PROPIONATE 50 MCG
1 SPRAY, SUSPENSION (ML) NASAL DAILY
Qty: 18.2 G | Refills: 1 | Status: SHIPPED | OUTPATIENT
Start: 2025-03-14

## 2025-03-14 RX ORDER — FLUTICASONE PROPIONATE 50 MCG
SPRAY, SUSPENSION (ML) NASAL
COMMUNITY
End: 2025-03-14 | Stop reason: SDUPTHER

## 2025-03-14 NOTE — PROGRESS NOTES
Subjective   The ABCs of the Annual Wellness Visit  Medicare Wellness Visit      Kulwant Arango is a 59 y.o. patient who presents for a Medicare Wellness Visit.    The following portions of the patient's history were reviewed and   updated as appropriate: allergies, current medications, past family history, past medical history, past social history, past surgical history, and problem list.    Compared to one year ago, the patient's physical   health is better.  Compared to one year ago, the patient's mental   health is the same.    Recent Hospitalizations:  He was not admitted to the hospital during the last year.     Current Medical Providers:  Patient Care Team:  Jaja Denson APRN as PCP - General (Family Medicine)    Outpatient Medications Prior to Visit   Medication Sig Dispense Refill    aspirin 81 MG EC tablet Take 1 tablet by mouth Daily.      EPINEPHrine (EPIPEN) 0.3 MG/0.3ML solution auto-injector injection INJECT 0.3 ML UNDER THE SKIN INTO THE APPROPRIATE AREA AS DIRECTED 1 (ONE) TIME FOR 1 DOSE.      fenofibrate (TRICOR) 48 MG tablet Take 1 tablet by mouth Daily. 90 tablet 3    sertraline (ZOLOFT) 50 MG tablet Take 1 tablet by mouth Daily. 90 tablet 3    famotidine (PEPCID) 20 MG tablet Take 1 tablet by mouth 2 (Two) Times a Day. 180 tablet 3    fluticasone (FLONASE) 50 MCG/ACT nasal spray SHAKE LIQUID AND USE 1 SPRAY IN EACH NOSTRIL EVERY DAY      omeprazole (priLOSEC) 20 MG capsule Take 1 capsule by mouth Daily.      ondansetron ODT (ZOFRAN-ODT) 4 MG disintegrating tablet Place 1 tablet on the tongue Every 8 (Eight) Hours As Needed for Nausea or Vomiting. 20 tablet 0    tadalafil (CIALIS) 10 MG tablet TAKE 1 TABLET BY MOUTH DAILY AS NEEDED FOR ERECTILE DYSFUNCTION. 6 tablet 1     No facility-administered medications prior to visit.     No opioid medication identified on active medication list. I have reviewed chart for other potential  high risk medication/s and harmful drug interactions in the  "elderly.      Aspirin is on active medication list. Aspirin use is indicated based on review of current medical condition/s. Pros and cons of this therapy have been discussed today. Benefits of this medication outweigh potential harm.  Patient has been encouraged to continue taking this medication.  .      Patient Active Problem List   Diagnosis    Obstructive sleep apnea syndrome     Advance Care Planning Advance Directive is not on file.  ACP discussion was held with the patient during this visit. Patient does not have an advance directive, declines further assistance.            Objective   Vitals:    25 0959   BP: 116/74   Pulse: 69   Temp: 98 °F (36.7 °C)   SpO2: 97%   Weight: 97.5 kg (215 lb)   Height: 182.9 cm (72.01\")   PainSc: 0-No pain       Estimated body mass index is 29.15 kg/m² as calculated from the following:    Height as of this encounter: 182.9 cm (72.01\").    Weight as of this encounter: 97.5 kg (215 lb).    BMI is >= 25 and <30. (Overweight) The following options were offered after discussion;: exercise counseling/recommendations and nutrition counseling/recommendations           Does the patient have evidence of cognitive impairment? No                                                                                                Health  Risk Assessment    Smoking Status:  Social History     Tobacco Use   Smoking Status Former    Current packs/day: 0.00    Average packs/day: 0.3 packs/day for 13.0 years (3.3 ttl pk-yrs)    Types: Cigarettes    Start date: 1977    Quit date: 1990    Years since quittin.2   Smokeless Tobacco Current    Types: Chew     Alcohol Consumption:  Social History     Substance and Sexual Activity   Alcohol Use Yes    Alcohol/week: 6.0 standard drinks of alcohol    Types: 5 Cans of beer, 1 Shots of liquor per week       Fall Risk Screen  STEADI Fall Risk Assessment was completed, and patient is at LOW risk for falls.Assessment completed " on:3/14/2025    Depression Screening   Little interest or pleasure in doing things? Not at all   Feeling down, depressed, or hopeless? Not at all   PHQ-2 Total Score 0      Health Habits and Functional and Cognitive Screening:      3/14/2025    10:05 AM   Functional & Cognitive Status   Do you have difficulty preparing food and eating? No   Do you have difficulty bathing yourself, getting dressed or grooming yourself? No   Do you have difficulty using the toilet? No   Do you have difficulty moving around from place to place? No   Do you have trouble with steps or getting out of a bed or a chair? No   Current Diet Well Balanced Diet   Dental Exam Up to date   Eye Exam Up to date   Exercise (times per week) 3 times per week   Current Exercises Include Walking   Do you need help using the phone?  No   Are you deaf or do you have serious difficulty hearing?  Yes   Do you need help to go to places out of walking distance? No   Do you need help shopping? No   Do you need help preparing meals?  No   Do you need help with housework?  No   Do you need help with laundry? No   Do you need help taking your medications? No   Do you need help managing money? No   Do you ever drive or ride in a car without wearing a seat belt? Yes   Have you felt unusual stress, anger or loneliness in the last month? No   Who do you live with? Spouse   If you need help, do you have trouble finding someone available to you? No   Have you been bothered in the last four weeks by sexual problems? No   Do you have difficulty concentrating, remembering or making decisions? No           Age-appropriate Screening Schedule:  Refer to the list below for future screening recommendations based on patient's age, sex and/or medical conditions. Orders for these recommended tests are listed in the plan section. The patient has been provided with a written plan.    Health Maintenance List  Health Maintenance   Topic Date Due    HEPATITIS C SCREENING  Never done     LIPID PANEL  04/13/2024    BMI FOLLOWUP  04/13/2024    INFLUENZA VACCINE  03/31/2025 (Originally 7/1/2024)    COVID-19 Vaccine (3 - 2024-25 season) 06/03/2025 (Originally 9/1/2024)    Pneumococcal Vaccine 50+ (1 of 1 - PCV) 03/14/2026 (Originally 7/7/2013)    TDAP/TD VACCINES (1 - Tdap) 03/14/2026 (Originally 7/7/1982)    ZOSTER VACCINE (1 of 2) 03/14/2026 (Originally 7/7/2013)    ANNUAL WELLNESS VISIT  03/14/2026    COLORECTAL CANCER SCREENING  04/24/2026                                                                                                                                                CMS Preventative Services Quick Reference  Risk Factors Identified During Encounter  Health maintenance components discussed along with recommendations    The above risks/problems have been discussed with the patient.  Pertinent information has been shared with the patient in the After Visit Summary.  An After Visit Summary and PPPS were made available to the patient.    Follow Up:   Next Medicare Wellness visit to be scheduled in 1 year.         Additional E&M Note during same encounter follows:  Patient has additional, significant, and separately identifiable condition(s)/problem(s) that require work above and beyond the Medicare Wellness Visit     Chief Complaint  Medicare Wellness-subsequent    Subjective    HPI  Kulwant is also being seen today for an annual adult preventative physical exam.        The patient is a 59-year-old male presenting for a Medicare wellness subsequent visit.    In July 2024, he had abdominal pain with elevated lipase and a CT showing an enlarged spleen, hepatic steatosis, and lumbar spine degenerative changes. He has lost weight over 2 years, abstained from alcohol since December 18, and aims to lose 10 more pounds with a diet including cottage cheese and yogurt. He reports severe abdominal pain but no nausea or vomiting. He takes omeprazole in the morning without dosage increase. He experiences  "fatigue and will check testosterone levels as advised by his sleep apnea specialist. He is compliant with vision and dental check-ups, and his last dermatology visit was last winter. He believes carrying his wallet in his back pocket causes back pain. He has not taken Cialis for a long time and reports no foot swelling.    He reports good health and seeks refills for medications, including Zoloft, which he tolerates well but attributes to erectile dysfunction. He discontinued Cialis due to headaches.    He has a history of hepatic steatosis and mild atherosclerosis, confirmed by a scan. He has a small, decreasing skin swelling with no insect bites.    SOCIAL HISTORY  No alcohol intake since December 18.    FAMILY HISTORY  Father had open heart surgery at 63 with 5 bypasses, another bypass in his 50s, and later leg surgery.    MEDICATIONS  Current: Zoloft, omeprazole, Cialis          Objective   Vital Signs:  /74   Pulse 69   Temp 98 °F (36.7 °C)   Ht 182.9 cm (72.01\")   Wt 97.5 kg (215 lb)   SpO2 97%   BMI 29.15 kg/m²   Physical Exam  Vitals and nursing note reviewed.   Constitutional:       General: He is not in acute distress.     Appearance: He is well-developed. He is obese.   HENT:      Head: Normocephalic and atraumatic.      Right Ear: Ear canal and external ear normal. No decreased hearing noted. Tympanic membrane is erythematous and bulging.      Left Ear: Ear canal and external ear normal. No decreased hearing noted. Tympanic membrane is erythematous and bulging.      Nose: Mucosal edema present. No rhinorrhea.      Right Sinus: No maxillary sinus tenderness or frontal sinus tenderness.      Left Sinus: No maxillary sinus tenderness or frontal sinus tenderness.      Mouth/Throat:      Mouth: Mucous membranes are dry.      Dentition: Normal dentition.      Pharynx: Posterior oropharyngeal erythema present.      Comments: PND    Eyes:      General: Lids are normal.      Conjunctiva/sclera: " Conjunctivae normal.      Pupils: Pupils are equal, round, and reactive to light.   Neck:      Thyroid: No thyroid mass or thyromegaly.      Vascular: No carotid bruit or JVD.   Cardiovascular:      Rate and Rhythm: Normal rate and regular rhythm.      Pulses: Normal pulses.      Heart sounds: Normal heart sounds, S1 normal and S2 normal. No murmur heard.  Pulmonary:      Effort: Pulmonary effort is normal. No respiratory distress.      Breath sounds: Normal breath sounds.   Abdominal:      General: Bowel sounds are normal. There is no distension or abdominal bruit.      Palpations: Abdomen is soft. There is no mass.      Tenderness: There is no abdominal tenderness.   Genitourinary:     Comments: Deferred   Musculoskeletal:         General: Deformity present. Normal range of motion.      Cervical back: Normal range of motion and neck supple.   Lymphadenopathy:      Head:      Right side of head: No submental, submandibular or tonsillar adenopathy.      Left side of head: No submental, submandibular or tonsillar adenopathy.      Cervical: No cervical adenopathy.      Upper Body:      Right upper body: No supraclavicular adenopathy.      Left upper body: No supraclavicular adenopathy.   Skin:     General: Skin is warm and dry.      Capillary Refill: Capillary refill takes less than 2 seconds.      Findings: No rash.      Nails: There is no clubbing.   Neurological:      Mental Status: He is alert and oriented to person, place, and time.      Cranial Nerves: No cranial nerve deficit.      Sensory: No sensory deficit.      Gait: Gait normal.      Deep Tendon Reflexes: Reflexes normal.   Psychiatric:         Speech: Speech normal.         Behavior: Behavior normal.         Thought Content: Thought content normal.         Judgment: Judgment normal.                       Results  Laboratory Studies  Lipase elevated at 142.    Imaging  CT of abdomen and pelvis showed enlarged spleen, hepatic steatosis, lumbar spine  degenerative changes, and abdominal aorta atherosclerosis.           Assessment and Plan        1. Medicare wellness subsequent visit  - Seen in July 2024 with abdominal pain, elevated lipase, and CT findings of enlarged spleen, , and lumbar spine degenerative changes  - Weight loss has positively impacted blood pressure  - Hepatic steatosis could progress to cirrhosis  - Mild atherosclerosis of the abdominal aorta extends into the iliac arteries  - Calcified granuloma in the right lung base  - Slightly swollen lymph nodes, possibly due to allergies  - Enlarged but non-tender liver  - Decreasing skin swelling with no insect bites  - Advised to maintain a healthy lifestyle, exercise, balanced diet, limited alcohol, stay hydrated, continue weight loss, ensure adequate protein intake  - Monitor lymph nodes  - Change toothbrush monthly  - Maintain good posture  - Avoid carrying wallet in back pocket  - Monitor feet for swelling  - Apply antibacterial soap to affected area and avoid touching it  - Comprehensive blood work including PSA, lipase, amylase, and testosterone levels  - Prescription for Bactroban if condition worsens    2. Erectile Dysfunction  - Reports erectile dysfunction, possibly due to Zoloft  - Advised to continue Cialis and monitor effectiveness  - Refills for Cialis sent to [facility] in [place]  - Consider alternative medications like Viagra if Cialis is ineffective    3. Elevated Lipase  - Previously elevated lipase suggests possible pancreatitis  - Recheck lipase and amylase levels in upcoming blood work    4. Hepatic Steatosis  - History of hepatic steatosis, which can progress to cirrhosis  - Advised to limit alcohol intake and follow a GLASS diet  - Liver function tests included in upcoming blood work    5. Mild Atherosclerosis  - Mild atherosclerosis of the abdominal aorta  - Advised to maintain a healthy lifestyle to prevent worsening    6. Calcified Granuloma  - Calcified granuloma in the  right lung base  - No immediate intervention required  - Advised to avoid inhaling irritants like hay    7. Medication Management  - Refills for Zoloft and Cialis sent to [facility] pharmacy in [place]         Follow Up   No follow-ups on file.  Patient was given instructions and counseling regarding his condition or for health maintenance advice. Please see specific information pulled into the AVS if appropriate.  Patient or patient representative verbalized consent for the use of Ambient Listening during the visit with  TRINA Pinedo for chart documentation.

## 2025-03-18 LAB
25(OH)D3+25(OH)D2 SERPL-MCNC: 45.1 NG/ML (ref 30–100)
A2 MACROGLOB SERPL-MCNC: 250 MG/DL (ref 110–276)
ALBUMIN SERPL-MCNC: 5 G/DL (ref 3.5–5.2)
ALBUMIN/GLOB SERPL: 1.6 G/DL
ALP SERPL-CCNC: 71 U/L (ref 39–117)
ALT SERPL W P-5'-P-CCNC: 63 IU/L (ref 0–55)
ALT SERPL-CCNC: 57 U/L (ref 1–41)
AMYLASE SERPL-CCNC: 54 U/L (ref 28–100)
APO A-I SERPL-MCNC: 157 MG/DL (ref 101–178)
AST SERPL W P-5'-P-CCNC: 42 IU/L (ref 0–40)
AST SERPL-CCNC: 38 U/L (ref 1–40)
BASOPHILS # BLD AUTO: 0.03 10*3/MM3 (ref 0–0.2)
BASOPHILS NFR BLD AUTO: 0.9 % (ref 0–1.5)
BILIRUB SERPL-MCNC: 0.6 MG/DL (ref 0–1.2)
BILIRUB SERPL-MCNC: 0.6 MG/DL (ref 0–1.2)
BUN SERPL-MCNC: 19 MG/DL (ref 8–23)
BUN/CREAT SERPL: 18.6 (ref 7–25)
CALCIUM SERPL-MCNC: 10.1 MG/DL (ref 8.6–10.5)
CHLORIDE SERPL-SCNC: 101 MMOL/L (ref 98–107)
CHOLEST SERPL-MCNC: 204 MG/DL (ref 100–199)
CHOLEST SERPL-MCNC: 208 MG/DL (ref 0–200)
CO2 SERPL-SCNC: 24 MMOL/L (ref 22–29)
CREAT SERPL-MCNC: 1.02 MG/DL (ref 0.76–1.27)
EGFRCR SERPLBLD CKD-EPI 2021: 83.6 ML/MIN/1.73
EOSINOPHIL # BLD AUTO: 0.12 10*3/MM3 (ref 0–0.4)
EOSINOPHIL NFR BLD AUTO: 3.6 % (ref 0.3–6.2)
ERYTHROCYTE [DISTWIDTH] IN BLOOD BY AUTOMATED COUNT: 12.6 % (ref 12.3–15.4)
FIBROSIS SCORING:: ABNORMAL
FIBROSIS STAGE SERPL QL: ABNORMAL
GGT SERPL-CCNC: 48 IU/L (ref 0–65)
GLOBULIN SER CALC-MCNC: 3.2 GM/DL
GLUCOSE SERPL-MCNC: 101 MG/DL (ref 70–99)
GLUCOSE SERPL-MCNC: 97 MG/DL (ref 65–99)
HAPTOGLOB SERPL-MCNC: 108 MG/DL (ref 32–363)
HBA1C MFR BLD: 5.5 % (ref 4.8–5.6)
HCT VFR BLD AUTO: 49.5 % (ref 37.5–51)
HDLC SERPL-MCNC: 46 MG/DL (ref 40–60)
HGB BLD-MCNC: 16.6 G/DL (ref 13–17.7)
IMM GRANULOCYTES # BLD AUTO: 0.02 10*3/MM3 (ref 0–0.05)
IMM GRANULOCYTES NFR BLD AUTO: 0.6 % (ref 0–0.5)
LABORATORY COMMENT REPORT: ABNORMAL
LDLC SERPL CALC-MCNC: 139 MG/DL (ref 0–100)
LIPASE SERPL-CCNC: 49 U/L (ref 13–60)
LIVER FIBR SCORE SERPL CALC.FIBROSURE: 0.46 (ref 0–0.21)
LIVER STEATOSIS GRADE SERPL QL: ABNORMAL
LIVER STEATOSIS SCORE SERPL: 0.51 (ref 0–0.4)
LYMPHOCYTES # BLD AUTO: 0.84 10*3/MM3 (ref 0.7–3.1)
LYMPHOCYTES NFR BLD AUTO: 25.3 % (ref 19.6–45.3)
MCH RBC QN AUTO: 29.6 PG (ref 26.6–33)
MCHC RBC AUTO-ENTMCNC: 33.5 G/DL (ref 31.5–35.7)
MCV RBC AUTO: 88.4 FL (ref 79–97)
MONOCYTES # BLD AUTO: 0.46 10*3/MM3 (ref 0.1–0.9)
MONOCYTES NFR BLD AUTO: 13.9 % (ref 5–12)
NASH GRADE SERPL QL: ABNORMAL
NASH INTERPRETATION SERPL-IMP: ABNORMAL
NASH SCORE SERPL: 0.76 (ref 0–0.25)
NASH SCORING: ABNORMAL
NEUTROPHILS # BLD AUTO: 1.85 10*3/MM3 (ref 1.7–7)
NEUTROPHILS NFR BLD AUTO: 55.7 % (ref 42.7–76)
NRBC BLD AUTO-RTO: 0 /100 WBC (ref 0–0.2)
PLATELET # BLD AUTO: 215 10*3/MM3 (ref 140–450)
POTASSIUM SERPL-SCNC: 4.6 MMOL/L (ref 3.5–5.2)
PROT SERPL-MCNC: 8.2 G/DL (ref 6–8.5)
PSA SERPL-MCNC: 1.17 NG/ML (ref 0–4)
RBC # BLD AUTO: 5.6 10*6/MM3 (ref 4.14–5.8)
SODIUM SERPL-SCNC: 141 MMOL/L (ref 136–145)
STEATOSIS SCORING: ABNORMAL
T4 FREE SERPL-MCNC: 1.02 NG/DL (ref 0.92–1.68)
TEST PERFORMANCE INFO SPEC: ABNORMAL
TEST PERFORMANCE INFO SPEC: ABNORMAL
TESTOST FREE SERPL-MCNC: 9.1 PG/ML (ref 6.6–18.1)
TESTOST SERPL-MCNC: 650.5 NG/DL (ref 264–916)
TRIGL SERPL-MCNC: 129 MG/DL (ref 0–150)
TRIGL SERPL-MCNC: 131 MG/DL (ref 0–149)
TSH SERPL DL<=0.005 MIU/L-ACNC: 1.71 UIU/ML (ref 0.27–4.2)
VIT B12 SERPL-MCNC: 823 PG/ML (ref 211–946)
VLDLC SERPL CALC-MCNC: 23 MG/DL (ref 5–40)
WBC # BLD AUTO: 3.32 10*3/MM3 (ref 3.4–10.8)